# Patient Record
Sex: MALE | Race: WHITE
[De-identification: names, ages, dates, MRNs, and addresses within clinical notes are randomized per-mention and may not be internally consistent; named-entity substitution may affect disease eponyms.]

---

## 2019-02-22 ENCOUNTER — HOSPITAL ENCOUNTER (INPATIENT)
Dept: HOSPITAL 31 - C.ER | Age: 69
LOS: 4 days | Discharge: HOME | DRG: 378 | End: 2019-02-26
Attending: FAMILY MEDICINE | Admitting: FAMILY MEDICINE
Payer: COMMERCIAL

## 2019-02-22 VITALS — BODY MASS INDEX: 26.8 KG/M2

## 2019-02-22 DIAGNOSIS — D72.829: ICD-10-CM

## 2019-02-22 DIAGNOSIS — Z87.891: ICD-10-CM

## 2019-02-22 DIAGNOSIS — K64.0: ICD-10-CM

## 2019-02-22 DIAGNOSIS — Z80.1: ICD-10-CM

## 2019-02-22 DIAGNOSIS — K63.5: ICD-10-CM

## 2019-02-22 DIAGNOSIS — Z95.1: ICD-10-CM

## 2019-02-22 DIAGNOSIS — I25.10: ICD-10-CM

## 2019-02-22 DIAGNOSIS — L03.116: ICD-10-CM

## 2019-02-22 DIAGNOSIS — K57.31: Primary | ICD-10-CM

## 2019-02-22 DIAGNOSIS — I10: ICD-10-CM

## 2019-02-22 DIAGNOSIS — K29.70: ICD-10-CM

## 2019-02-22 DIAGNOSIS — R73.9: ICD-10-CM

## 2019-02-22 DIAGNOSIS — Z79.01: ICD-10-CM

## 2019-02-22 DIAGNOSIS — D50.0: ICD-10-CM

## 2019-02-22 DIAGNOSIS — F10.10: ICD-10-CM

## 2019-02-22 DIAGNOSIS — F43.9: ICD-10-CM

## 2019-02-22 LAB
ALBUMIN SERPL-MCNC: 3.9 G/DL (ref 3.5–5)
ALBUMIN/GLOB SERPL: 1.5 {RATIO} (ref 1–2.1)
ALT SERPL-CCNC: 20 U/L (ref 21–72)
APTT BLD: 28 SECONDS (ref 21–34)
AST SERPL-CCNC: 39 U/L (ref 17–59)
BASOPHILS # BLD AUTO: 0 K/UL (ref 0–0.2)
BASOPHILS # BLD AUTO: 0 K/UL (ref 0–0.2)
BASOPHILS NFR BLD: 0.2 % (ref 0–2)
BASOPHILS NFR BLD: 0.4 % (ref 0–2)
BUN SERPL-MCNC: 32 MG/DL (ref 9–20)
CALCIUM SERPL-MCNC: 8.9 MG/DL (ref 8.6–10.4)
EOSINOPHIL # BLD AUTO: 0.2 K/UL (ref 0–0.7)
EOSINOPHIL # BLD AUTO: 0.2 K/UL (ref 0–0.7)
EOSINOPHIL NFR BLD: 1.2 % (ref 0–4)
EOSINOPHIL NFR BLD: 2.5 % (ref 0–4)
ERYTHROCYTE [DISTWIDTH] IN BLOOD BY AUTOMATED COUNT: 13 % (ref 11.5–14.5)
ERYTHROCYTE [DISTWIDTH] IN BLOOD BY AUTOMATED COUNT: 13.1 % (ref 11.5–14.5)
GFR NON-AFRICAN AMERICAN: > 60
HGB BLD-MCNC: 10.5 G/DL (ref 12–18)
HGB BLD-MCNC: 8.4 G/DL (ref 12–18)
INR PPP: 1.1
LYMPHOCYTES # BLD AUTO: 1.4 K/UL (ref 1–4.3)
LYMPHOCYTES # BLD AUTO: 2 K/UL (ref 1–4.3)
LYMPHOCYTES NFR BLD AUTO: 10.4 % (ref 20–40)
LYMPHOCYTES NFR BLD AUTO: 31.4 % (ref 20–40)
MCH RBC QN AUTO: 30.8 PG (ref 27–31)
MCH RBC QN AUTO: 31.1 PG (ref 27–31)
MCHC RBC AUTO-ENTMCNC: 32.6 G/DL (ref 33–37)
MCHC RBC AUTO-ENTMCNC: 33.5 G/DL (ref 33–37)
MCV RBC AUTO: 92.7 FL (ref 80–94)
MCV RBC AUTO: 94.7 FL (ref 80–94)
MONOCYTES # BLD: 0.5 K/UL (ref 0–0.8)
MONOCYTES # BLD: 0.5 K/UL (ref 0–0.8)
MONOCYTES NFR BLD: 3.9 % (ref 0–10)
MONOCYTES NFR BLD: 8.1 % (ref 0–10)
NEUTROPHILS # BLD: 11.3 K/UL (ref 1.8–7)
NEUTROPHILS # BLD: 3.6 K/UL (ref 1.8–7)
NEUTROPHILS NFR BLD AUTO: 57.6 % (ref 50–75)
NEUTROPHILS NFR BLD AUTO: 84.3 % (ref 50–75)
NRBC BLD AUTO-RTO: 0 % (ref 0–2)
NRBC BLD AUTO-RTO: 0 % (ref 0–2)
PLATELET # BLD: 171 K/UL (ref 130–400)
PLATELET # BLD: 225 K/UL (ref 130–400)
PMV BLD AUTO: 7.8 FL (ref 7.2–11.7)
PMV BLD AUTO: 8.5 FL (ref 7.2–11.7)
PROTHROMBIN TIME: 11.8 SECONDS (ref 9.7–12.2)
RBC # BLD AUTO: 2.68 MIL/UL (ref 4.4–5.9)
RBC # BLD AUTO: 3.42 MIL/UL (ref 4.4–5.9)
WBC # BLD AUTO: 13.3 K/UL (ref 4.8–10.8)
WBC # BLD AUTO: 6.3 K/UL (ref 4.8–10.8)

## 2019-02-22 RX ADMIN — HUMAN INSULIN SCH: 100 INJECTION, SOLUTION SUBCUTANEOUS at 21:19

## 2019-02-22 NOTE — C.PDOC
History Of Present Illness


68 year old male presents to ED with complaint of new onset, multiple episodes 

of bright red blood per rectum since 5 pm yesterday. Last episode was at 0800. 

Patient states there is bright red blood with clots with every bowel movement. 

Patient states he has also been experiencing occasional lightheadedness. Patient

has history of prior SIM episode due to "bleeding vein" in the rectum but 

current symptoms are worse. He is currently taking Plavix. Patient denies 

abdominal pain and rectal pain. Patient denies having abdominal surgery. 








NEW ONSET MULTIPLE BRBPR SINCE 5 PM YEST. LAST EPISODE 0800, PS BRB W CLOTS W 

EVERY BM. +ASSOC OCC LIGHTHEADED. NO ABD, RECTAL PAIN. HO PRIOR SIM EPISODE DUE 

TO "BLEEDING VEIN" IN RECTUM BUT CURRENT SX MORE WORSE. DENIES ABD SURG. ON 

PLAVIX.





EXAM


NONTOXIC


HEENT NO PALLOR


ABD NEG


+BRBPR


REMAINDER NEG


Time Seen by Provider: 02/22/19 12:06


Chief Complaint (Nursing): GI Problem


History Per: Patient


History/Exam Limitations: no limitations


Onset/Duration Of Symptoms: Days (1), Sudden Onset


Current Symptoms Are (Timing): Still Present


Number Of Bleeding Episodes: Multiple:


Associated Symptoms: Lightheadedness





Past Medical History


Reviewed: Historical Data, Nursing Documentation, Vital Signs


Vital Signs: 





                                Last Vital Signs











Temp  97.5 F L  02/22/19 11:56


 


Pulse  102 H  02/22/19 11:56


 


Resp  18   02/22/19 11:56


 


BP  156/74 H  02/22/19 11:56


 


Pulse Ox  100   02/22/19 11:56














- Medical History


PMH: HTN


   Denies: Chronic Kidney Disease


Surgical History: CABG (2013)





- [facility] Procedures











MEASURE OF CARDIAC SAMPL & PRESSURE, L HEART, PERC APPROACH (05/14/16)


PLAIN RADIOGRAPHY OF LEFT HEART USING OTHER CONTRAST (05/14/16)


PLAIN RADIOGRAPHY OF MULT COR ART USING OTH CONTRAST (05/14/16)








Family History: States: Unknown Family Hx





- Social History


Hx Alcohol Use: No (social)


Hx Substance Use: No





- Immunization History


Hx Tetanus Toxoid Vaccination: No


Hx Influenza Vaccination: No


Hx Pneumococcal Vaccination: No





Review Of Systems


Constitutional: Negative for: Fever, Chills, Weakness


Cardiovascular: Positive for: Light Headedness


Gastrointestinal: Positive for: Other (bright red blood per rectum).  Negative 

for: Nausea, Vomiting, Abdominal Pain, Rectal Pain


Neurological: Negative for: Weakness, Numbness, Dizziness





Physical Exam





- Physical Exam


Appears: Well, Non-toxic, No Acute Distress


Skin: Normal Color, Warm, Dry, No Pale


Head: Atraumatic, Normacephalic


Neck: Normal ROM, Supple


Chest: Symmetrical, No Deformity


Cardiovascular: Rhythm Regular, No Murmur


Respiratory: No Accessory Muscle Use, No Rales, No Rhonchi, No Wheezing


Gastrointestinal/Abdominal: Soft, No Tenderness


Rectal: Heme Positive (BRBPR)


Extremity: Capillary Refill (<2 seconds)


Extremity: Bilateral: Atraumatic, Normal Color And Temperature


Pulses: Left Radial: Normal, Right Radial: Normal


Neurological/Psych: Oriented x3, Normal Speech, Normal Cognition





ED Course And Treatment





- Laboratory Results


Result Diagrams: 


                                 02/22/19 12:42





                                 02/22/19 12:42


O2 Sat by Pulse Oximetry: 100 (RA)





- Other Rad


  ** CXR


X-Ray: Interpreted by Me, Viewed By Me


Interpretation: Accession No. : [id number].  Patient Name / ID : PASCALE SAUCEDA  / [id number].  Exam Date : 02/22/2019 12:44:14 ( Approved ).  Study 

Comment :  Sex / Age : M  / 68Y.  Creator : Enrrique Macias MD.  Dictator : 

Enrrique Macias MD.   :  Approver : Enrrique Macias MD.  

Approver2 :  Report Date : 02/22/2019 13:09:26.  My Comment :  

**************************************************************************

*********.  Date of service:  02/22/2019.  HISTORY:  GI Bleeding.  COMPARISON:  

Comparison made with prior chest radiograph dated 05/13/2016.  FINDINGS:  LUNGS:

 Minor bibasilar atelectasis and or scarring.  PLEURA:  No significant pleural 

effusion identified, no pneumothorax apparent.  CARDIOVASCULAR:  Sternotomy 

wires and CABG clips and mitral valve replacement..  Heart size upper limits of 

normal/borderline enlarged.  Mild aortic atherosclerotic calcification present. 

OSSEOUS STRUCTURES:  No significant abnormalities.  VISUALIZED UPPER ABDOMEN:  

Normal.  OTHER FINDINGS:  None.  IMPRESSION:  Minor bibasilar atelectasis and or

scarring.  No significant interval change.


Progress Note: Labs ordered with UA and Stool sample. EKG and CXR ordered for 

patient.





Progress





- Re-Evaluation


Re-evaluation Note: 





02/22/19 13:13


NO RECUR GI BLEED SINCE INITAL EVAL.





H/H LOWER COMPARED TO PRIOR. PENDING CALLBACK HOSP


02/22/19 13:15


D/W DR COLBY DARLING C/F PMD WILL ADMIT





- Data Reviewed


Data Reviewed: Lab, Diagnostic imaging, EKG, Old records





- Critical Care


Citical Care: Excluding Proc Time


Critical Care Time: 60 minutes





Disposition


Counseled Patient/Family Regarding: Studies Performed, Diagnosis





- Disposition


Disposition: HOSPITALIZED


Disposition Time: 13:15


Condition: STABLE





- POA


Present On Arrival: None





- Clinical Impression


Clinical Impression: 


 Hx of CABG, Lower GI bleed, Near syncope, Anemia








- Scribe Statement


The provider has reviewed the documentation as recorded by the Scribe (Rosio Harp)








All medical record entries made by the Scribe were at my direction and 

personally dictated by me. I have reviewed the chart and agree that the record 

accurately reflects my personal performance of the history, physical exam, 

medical decision making, and the department course for this patient. I have also

personally directed, reviewed, and agree with the discharge instructions and 

disposition.

## 2019-02-22 NOTE — CP.PCM.HP
<Sky Francis - Last Filed: 02/22/19 16:46>





History of Present Illness





- History of Present Illness


History of Present Illness: 





cc: blood in my toilet, its so red, help me





HPI


68M with a PMHx of HTN, CAD, and hemorrhoids presents to the ED with a 1 day hx 

of blood in his toilet. Pt reports bright red clotted stool starting at aroung 

17:00 yesterday.  He had another episode this morning at 8:00. Pt has hade 

episode of bleeding per rectum earlier this year in July for which he followed 

up with a GI doctor at [facility]. Pt says the last bloody bowel 

movement caused him to have some light headedness. Pt has been taking plavix for

his CAD but stopped this morning. Pt reports painless and loose stool. Reports 

last drink was sunday. 





pmhx: HTN and CAD, bleeding vein per rectum


pshx: CABG in 2013, Back surgery 1998


shx: former smoker quit 10 years ago, denies drug use, drinks a six pack on 

weekends, retired 


fhx: mom lung CA


pmd: Dr Avila, GI: [facility]


 


Home Rx


Lipitor 40


ambien 10 hs


flexeril 10


coreg 3.125 qd


plavix 75 qd


ASA 81 qd


motrin 800mg once/ twice a week for lbp





Broth proxy Hayder 


Full code





Present on Admission





- Present on Admission


Any Indicators Present on Admission: No





Review of Systems





- Constitutional


Constitutional: absent: Headache, Night Sweats, Weight Loss





- EENT


Eyes: absent: Blurred Vision





- Cardiovascular


Cardiovascular: Lightheadedness.  absent: Chest Pain, Dyspnea, Pain Radiating to

Arm/Neck/Jaw





- Gastrointestinal


Gastrointestinal: Change in Stool Character, Hematochezia, Loose Stools.  

absent: Abdominal Pain





- Musculoskeletal


Musculoskeletal: absent: Myalgias, Numbness





- Integumentary


Integumentary: absent: Pruritus, Rash





- Neurological


Neurological: Dizziness.  absent: Frequent Falls, Headaches, Memory Loss, 

Syncope, Vertigo





- Psychiatric


Psychiatric: absent: Anxiety, Difficulty Concentrating





- Hematologic/Lymphatic


Hematologic: absent: Easy Bleeding





Past Patient History





- Infectious Disease


Hx of Infectious Diseases: None





- Tetanus Immunizations


Tetanus Immunization: Unknown





- Past Medical History & Family History


Past Medical History?: Yes





- Past Social History


Smoking Status: Never Smoked





- CARDIAC


Hx Hypertension: Yes





- PULMONARY


Hx Respiratory Disorders: No





- NEUROLOGICAL


Hx Neurological Disorder: No





- HEENT


Hx HEENT Problems: No





- RENAL


Hx Chronic Kidney Disease: No





- ENDOCRINE/METABOLIC


Hx Endocrine Disorders: No





- HEMATOLOGICAL/ONCOLOGICAL


Hx Blood Disorders: No





- INTEGUMENTARY


Hx Dermatological Problems: No


Other/Comment: L leg cellulitis





- MUSCULOSKELETAL/RHEUMATOLOGICAL


Hx Musculoskeletal Disorders: No


Hx Falls: No


Other/Comment: states sometimes he loses balance





- GASTROINTESTINAL


Hx Gastrointestinal Disorders: No





- GENITOURINARY/GYNECOLOGICAL


Hx Genitourinary Disorders: No





- PSYCHIATRIC


Hx Substance Use: No





- SURGICAL HISTORY


Hx Coronary Artery Bypass Graft: Yes (2013)





- ANESTHESIA


Hx Anesthesia: Yes


Hx Anesthesia Reactions: No


Hx Malignant Hyperthermia: No





Meds


Allergies/Adverse Reactions: 


                                    Allergies











Allergy/AdvReac Type Severity Reaction Status Date / Time


 


No Known Allergies Allergy   Verified 05/13/16 15:51














Physical Exam





- Constitutional


Appears: Well, Non-toxic, No Acute Distress





- Head Exam


Head Exam: ATRAUMATIC, NORMAL INSPECTION





- Eye Exam


Eye Exam: EOMI, Normal appearance.  absent: Conjunctival injection (pale 

conjunctive)





- ENT Exam


ENT Exam: Mucous Membranes Moist





- Respiratory Exam


Respiratory Exam: Clear to Auscultation Bilateral, NORMAL BREATHING PATTERN.  

absent: Rales, Wheezes





- Cardiovascular Exam


Cardiovascular Exam: RRR, +S1, +S2





- GI/Abdominal Exam


GI & Abdominal Exam: Normal Bowel Sounds, Soft.  absent: Guarding, Rebound, 

Rigid, Tenderness





- Rectal Exam


Rectal Exam: Bloody Stool.  absent: Black Stool, Hemorrhoids, Fecal Impaction


Additional comments: 





malodorous, bloody smear on FOBT cards note, painless ISELA 





- Extremities Exam


Extremities exam: Positive for: pedal pulses present





- Neurological Exam


Neurological exam: Alert, CN II-XII Intact, Oriented x3





- Skin


Skin Exam: Dry, Pallor, Warm





Results





- Vital Signs


Recent Vital Signs: 





                                Last Vital Signs











Temp  97.5 F L  02/22/19 11:56


 


Pulse  81   02/22/19 12:26


 


Resp  20   02/22/19 12:26


 


BP  138/71   02/22/19 12:26


 


Pulse Ox  100   02/22/19 13:45














- Labs


Result Diagrams: 


                                 02/22/19 12:42





                                 02/22/19 12:42


Labs: 





                         Laboratory Results - last 24 hr











  02/22/19 02/22/19 02/22/19





  12:42 12:42 12:42


 


WBC  13.3 H D  


 


RBC  3.42 L  


 


Hgb  10.5 L D  


 


Hct  32.3 L  


 


MCV  94.7 H D  


 


MCH  30.8  


 


MCHC  32.6 L  


 


RDW  13.1  


 


Plt Count  225  


 


MPV  8.5  


 


Neut % (Auto)  84.3 H  


 


Lymph % (Auto)  10.4 L  


 


Mono % (Auto)  3.9  


 


Eos % (Auto)  1.2  


 


Baso % (Auto)  0.2  


 


Neut # (Auto)  11.3 H  


 


Lymph # (Auto)  1.4  


 


Mono # (Auto)  0.5  


 


Eos # (Auto)  0.2  


 


Baso # (Auto)  0.0  


 


PT   11.8 


 


INR   1.1 


 


APTT   28 


 


Sodium    136


 


Potassium    3.7


 


Chloride    104


 


Carbon Dioxide    25


 


Anion Gap    11


 


BUN    32 H


 


Creatinine    1.0


 


Est GFR ( Amer)    > 60


 


Est GFR (Non-Af Amer)    > 60


 


Random Glucose    219 H D


 


Calcium    8.9


 


Total Bilirubin    1.1


 


AST    39


 


ALT    20 L D


 


Alkaline Phosphatase    69


 


Total Protein    6.5


 


Albumin    3.9


 


Globulin    2.6


 


Albumin/Globulin Ratio    1.5


 


Blood Type   


 


Antibody Screen   














  02/22/19





  12:42


 


WBC 


 


RBC 


 


Hgb 


 


Hct 


 


MCV 


 


MCH 


 


MCHC 


 


RDW 


 


Plt Count 


 


MPV 


 


Neut % (Auto) 


 


Lymph % (Auto) 


 


Mono % (Auto) 


 


Eos % (Auto) 


 


Baso % (Auto) 


 


Neut # (Auto) 


 


Lymph # (Auto) 


 


Mono # (Auto) 


 


Eos # (Auto) 


 


Baso # (Auto) 


 


PT 


 


INR 


 


APTT 


 


Sodium 


 


Potassium 


 


Chloride 


 


Carbon Dioxide 


 


Anion Gap 


 


BUN 


 


Creatinine 


 


Est GFR ( Amer) 


 


Est GFR (Non-Af Amer) 


 


Random Glucose 


 


Calcium 


 


Total Bilirubin 


 


AST 


 


ALT 


 


Alkaline Phosphatase 


 


Total Protein 


 


Albumin 


 


Globulin 


 


Albumin/Globulin Ratio 


 


Blood Type  O POSITIVE


 


Antibody Screen  Negative














Assessment & Plan





- Assessment and Plan (Free Text)


Assessment: 





68M admitted for blood per rectum and hx of 2 bloody stools





Acute Lower GI bleed possibly due to colonic pathology


GI consulted: Dr Linda


PMD Dr Avila contacted re prior workup at [facility] for prior scope hx


f/u cbc q6 until 3pm tmrw


PTX 40 IVP q12


f/u CMP


f/u COAGs


Type and Screen


LRs @ 





Anemia secondary to Lower GI bleed


f/u AM Labs


cbc q6


f/u GI recs





Leukocytosis likely secondary to Stress


afebrile


normal vitals


no culture/ abx for now


f/u CBCs





Elevated BUN


LRs @ 125


f/u am CMP





Hyperglycemia


f/u a1c


f/u tsh and free t4


ISS Low





Excessive ETOH use


last drink was 6 beers on sunday


watch for signs of withdrawl, please page on call Dr Tloentino of CAD s/p CABG '13


Crestor 10 HS


Coreg 3.125 qd


hold ASA and Plavix





Hx of HTN


Coreg 3.125 qd





Lung CA Screening


30 pk yr, f/u w/ Margarita





PPx


Hold anti coag


SCDs bilaterally


PTX 40 IVP q12





<Timo Marroquin - Last Filed: 02/22/19 19:05>





Results





- Vital Signs


Recent Vital Signs: 





                                Last Vital Signs











Temp  97.8 F   02/22/19 15:37


 


Pulse  80   02/22/19 15:37


 


Resp  20   02/22/19 15:37


 


BP  125/69   02/22/19 15:37


 


Pulse Ox  99   02/22/19 15:37














- Labs


Result Diagrams: 


                                 02/22/19 12:42





                                 02/22/19 12:42


Labs: 





                         Laboratory Results - last 24 hr











  02/22/19 02/22/19 02/22/19





  12:42 12:42 12:42


 


WBC  13.3 H D  


 


RBC  3.42 L  


 


Hgb  10.5 L D  


 


Hct  32.3 L  


 


MCV  94.7 H D  


 


MCH  30.8  


 


MCHC  32.6 L  


 


RDW  13.1  


 


Plt Count  225  


 


MPV  8.5  


 


Neut % (Auto)  84.3 H  


 


Lymph % (Auto)  10.4 L  


 


Mono % (Auto)  3.9  


 


Eos % (Auto)  1.2  


 


Baso % (Auto)  0.2  


 


Neut # (Auto)  11.3 H  


 


Lymph # (Auto)  1.4  


 


Mono # (Auto)  0.5  


 


Eos # (Auto)  0.2  


 


Baso # (Auto)  0.0  


 


PT   11.8 


 


INR   1.1 


 


APTT   28 


 


Sodium    136


 


Potassium    3.7


 


Chloride    104


 


Carbon Dioxide    25


 


Anion Gap    11


 


BUN    32 H


 


Creatinine    1.0


 


Est GFR ( Amer)    > 60


 


Est GFR (Non-Af Amer)    > 60


 


Random Glucose    219 H D


 


Calcium    8.9


 


Total Bilirubin    1.1


 


AST    39


 


ALT    20 L D


 


Alkaline Phosphatase    69


 


Total Protein    6.5


 


Albumin    3.9


 


Globulin    2.6


 


Albumin/Globulin Ratio    1.5


 


Stool Occult Blood   


 


Blood Type   


 


Antibody Screen   














  02/22/19 02/22/19





  12:42 14:52


 


WBC  


 


RBC  


 


Hgb  


 


Hct  


 


MCV  


 


MCH  


 


MCHC  


 


RDW  


 


Plt Count  


 


MPV  


 


Neut % (Auto)  


 


Lymph % (Auto)  


 


Mono % (Auto)  


 


Eos % (Auto)  


 


Baso % (Auto)  


 


Neut # (Auto)  


 


Lymph # (Auto)  


 


Mono # (Auto)  


 


Eos # (Auto)  


 


Baso # (Auto)  


 


PT  


 


INR  


 


APTT  


 


Sodium  


 


Potassium  


 


Chloride  


 


Carbon Dioxide  


 


Anion Gap  


 


BUN  


 


Creatinine  


 


Est GFR ( Amer)  


 


Est GFR (Non-Af Amer)  


 


Random Glucose  


 


Calcium  


 


Total Bilirubin  


 


AST  


 


ALT  


 


Alkaline Phosphatase  


 


Total Protein  


 


Albumin  


 


Globulin  


 


Albumin/Globulin Ratio  


 


Stool Occult Blood   Positive H


 


Blood Type  O POSITIVE 


 


Antibody Screen  Negative 














Attending/Attestation





- Attestation


I have personally seen and examined this patient.: Yes


I have fully participated in the care of the patient.: Yes


I have reviewed all pertinent clinical information: Yes


Notes (Text): 





02/22/19 19:02


Patient was seen and examined shortly after resident Dr. Francis.





History, physical, assessment and plan and all orders were gone over in detail 

with Dr. Francis.





Spoke with GI Dr. Mitchell (Dr. Bhandari will be covering this weekend): montior 

HgB/Hct for now and if there is a huge drop then will perform Colonoscopy 

earlier but would rather wait considering ASA and Plavix use for Hx CABG.





Plan of care was explained to patient by me in Lithuanian.





Timo Marroquin D.O.

## 2019-02-22 NOTE — RAD
Date of service: 



02/22/2019



HISTORY:

 GI Bleeding 



COMPARISON:

Comparison made with prior chest radiograph dated 05/13/2016.



FINDINGS:



LUNGS:

Minor bibasilar atelectasis and or scarring



PLEURA:

No significant pleural effusion identified, no pneumothorax apparent.



CARDIOVASCULAR:

Sternotomy wires and CABG clips and mitral valve replacement..  Heart 

size upper limits of normal/borderline enlarged.  Mild aortic 

atherosclerotic calcification present.



OSSEOUS STRUCTURES:

No significant abnormalities.



VISUALIZED UPPER ABDOMEN:

Normal.



OTHER FINDINGS:

None.



IMPRESSION:

Minor bibasilar atelectasis and or scarring.  No significant interval 

change.

## 2019-02-22 NOTE — CP.PCM.CON
History of Present Illness





- History of Present Illness


History of Present Illness: 


69 yo male h/o CAD, HTN, now admitted with RB.  1 episode last night and another

episode this am at home.  No bleeding since at hosp.  Pt reports hemorrhoids and

bleeding.  Reports neg colonsocopy 8 months ago.





Review of Systems





- Constitutional


Constitutional: absent: Fatigue, Fever, Weight Gain, Weight Loss





- EENT


Eyes: absent: Photophobia


Nose/Mouth/Throat: absent: Dental Pain





- Cardiovascular


Cardiovascular: absent: Chest Pain, Dyspnea





- Respiratory


Respiratory: absent: Hemoptysis, Wheezing





- Gastrointestinal


Gastrointestinal: Hematochezia.  absent: Abdominal Pain, Bloating, Coffee Ground

Emesis, Constipation, Dyspepsia, Dysphagia, Hematemesis, Loose Stools, Melena, 

Nausea, Vomiting





- Genitourinary


Genitourinary: absent: Dysuria, Hematuria





- Integumentary


Integumentary: absent: Jaundice





- Neurological


Neurological: absent: Convulsions, Numbness





Past Patient History





- Infectious Disease


Hx of Infectious Diseases: None





- Tetanus Immunizations


Tetanus Immunization: Unknown





- Past Medical History & Family History


Past Medical History?: Yes





- Past Social History


Smoking Status: Never Smoked





- CARDIAC


Hx Hypertension: Yes





- PULMONARY


Hx Respiratory Disorders: No





- NEUROLOGICAL


Hx Neurological Disorder: No





- HEENT


Hx HEENT Problems: No





- RENAL


Hx Chronic Kidney Disease: No





- ENDOCRINE/METABOLIC


Hx Endocrine Disorders: No





- HEMATOLOGICAL/ONCOLOGICAL


Hx Blood Disorders: No





- INTEGUMENTARY


Hx Dermatological Problems: No


Other/Comment: L leg cellulitis





- MUSCULOSKELETAL/RHEUMATOLOGICAL


Hx Musculoskeletal Disorders: No


Hx Falls: No


Other/Comment: states sometimes he loses balance





- GASTROINTESTINAL


Hx Gastrointestinal Disorders: No





- GENITOURINARY/GYNECOLOGICAL


Hx Genitourinary Disorders: No





- PSYCHIATRIC


Hx Substance Use: No





- SURGICAL HISTORY


Hx Coronary Artery Bypass Graft: Yes (2013)





- ANESTHESIA


Hx Anesthesia: Yes


Hx Anesthesia Reactions: No


Hx Malignant Hyperthermia: No





Meds


Allergies/Adverse Reactions: 


                                    Allergies











Allergy/AdvReac Type Severity Reaction Status Date / Time


 


No Known Allergies Allergy   Verified 05/13/16 15:51














- Medications


Medications: 


                               Current Medications





Lactated Ringer's (Lactated Ringer's)  1,000 mls @ 125 mls/hr IV .Q8H [facility]


   Last Admin: 02/22/19 16:29 Dose:  125 mls/hr


Pantoprazole Sodium (Protonix Inj)  40 mg IVP Q12H [facility]


   Last Admin: 02/22/19 16:30 Dose:  40 mg











Physical Exam





- Constitutional


Appears: Well, Non-toxic





- Respiratory Exam


Respiratory Exam: Clear to Auscultation Bilateral





- Cardiovascular Exam


Cardiovascular Exam: RRR





- GI/Abdominal Exam


GI & Abdominal Exam: Normal Bowel Sounds, Soft.  absent: Guarding, Mass, 

Rebound, Tenderness





- Extremities Exam


Extremities exam: Negative for: calf tenderness





- Neurological Exam


Neurological exam: Alert, Oriented x3





- Psychiatric Exam


Psychiatric exam: Normal Mood





Results





- Vital Signs


Recent Vital Signs: 


                                Last Vital Signs











Temp  97.5 F L  02/22/19 11:56


 


Pulse  77   02/22/19 14:20


 


Resp  18   02/22/19 14:20


 


BP  125/63   02/22/19 14:20


 


Pulse Ox  97   02/22/19 14:20














- Labs


Result Diagrams: 


                                 02/22/19 12:42





                                 02/22/19 12:42


Labs: 


                         Laboratory Results - last 24 hr











  02/22/19 02/22/19 02/22/19





  12:42 12:42 12:42


 


WBC  13.3 H D  


 


RBC  3.42 L  


 


Hgb  10.5 L D  


 


Hct  32.3 L  


 


MCV  94.7 H D  


 


MCH  30.8  


 


MCHC  32.6 L  


 


RDW  13.1  


 


Plt Count  225  


 


MPV  8.5  


 


Neut % (Auto)  84.3 H  


 


Lymph % (Auto)  10.4 L  


 


Mono % (Auto)  3.9  


 


Eos % (Auto)  1.2  


 


Baso % (Auto)  0.2  


 


Neut # (Auto)  11.3 H  


 


Lymph # (Auto)  1.4  


 


Mono # (Auto)  0.5  


 


Eos # (Auto)  0.2  


 


Baso # (Auto)  0.0  


 


PT   11.8 


 


INR   1.1 


 


APTT   28 


 


Sodium    136


 


Potassium    3.7


 


Chloride    104


 


Carbon Dioxide    25


 


Anion Gap    11


 


BUN    32 H


 


Creatinine    1.0


 


Est GFR ( Amer)    > 60


 


Est GFR (Non-Af Amer)    > 60


 


Random Glucose    219 H D


 


Calcium    8.9


 


Total Bilirubin    1.1


 


AST    39


 


ALT    20 L D


 


Alkaline Phosphatase    69


 


Total Protein    6.5


 


Albumin    3.9


 


Globulin    2.6


 


Albumin/Globulin Ratio    1.5


 


Stool Occult Blood   


 


Blood Type   


 


Antibody Screen   














  02/22/19 02/22/19





  12:42 14:52


 


WBC  


 


RBC  


 


Hgb  


 


Hct  


 


MCV  


 


MCH  


 


MCHC  


 


RDW  


 


Plt Count  


 


MPV  


 


Neut % (Auto)  


 


Lymph % (Auto)  


 


Mono % (Auto)  


 


Eos % (Auto)  


 


Baso % (Auto)  


 


Neut # (Auto)  


 


Lymph # (Auto)  


 


Mono # (Auto)  


 


Eos # (Auto)  


 


Baso # (Auto)  


 


PT  


 


INR  


 


APTT  


 


Sodium  


 


Potassium  


 


Chloride  


 


Carbon Dioxide  


 


Anion Gap  


 


BUN  


 


Creatinine  


 


Est GFR ( Amer)  


 


Est GFR (Non-Af Amer)  


 


Random Glucose  


 


Calcium  


 


Total Bilirubin  


 


AST  


 


ALT  


 


Alkaline Phosphatase  


 


Total Protein  


 


Albumin  


 


Globulin  


 


Albumin/Globulin Ratio  


 


Stool Occult Blood   Positive H


 


Blood Type  O POSITIVE 


 


Antibody Screen  Negative 














Assessment & Plan





- Assessment and Plan (Free Text)


Assessment: 





1) RB- consider hemaorrhoids.  I doubt tumor- had rcent colonscoopy.  Consider 

diverticulosis, ectasia.  Bleeding may be promoted by ASA and plavix.  Hb 2 yra 

AGO was 12.  Rectal exam done by medcial team.  Discussed with Med Resident.  


REc- check Hb, anusol cream, consider repeat colonsoocpy.   Can Plavix be held?


2) CAD


3) HTN


4) Pt reports hemorrhoids

## 2019-02-23 LAB
ALBUMIN SERPL-MCNC: 2.8 G/DL (ref 3.5–5)
ALBUMIN/GLOB SERPL: 1.2 {RATIO} (ref 1–2.1)
ALT SERPL-CCNC: 27 U/L (ref 21–72)
APTT BLD: 31 SECONDS (ref 21–34)
AST SERPL-CCNC: 27 U/L (ref 17–59)
BASOPHILS # BLD AUTO: 0 K/UL (ref 0–0.2)
BASOPHILS NFR BLD: 0.5 % (ref 0–2)
BASOPHILS NFR BLD: 0.6 % (ref 0–2)
BASOPHILS NFR BLD: 0.6 % (ref 0–2)
BILIRUB UR-MCNC: NEGATIVE MG/DL
BUN SERPL-MCNC: 20 MG/DL (ref 9–20)
CALCIUM SERPL-MCNC: 8.1 MG/DL (ref 8.6–10.4)
EOSINOPHIL # BLD AUTO: 0.3 K/UL (ref 0–0.7)
EOSINOPHIL # BLD AUTO: 0.3 K/UL (ref 0–0.7)
EOSINOPHIL # BLD AUTO: 0.4 K/UL (ref 0–0.7)
EOSINOPHIL NFR BLD: 5.7 % (ref 0–4)
EOSINOPHIL NFR BLD: 6.4 % (ref 0–4)
EOSINOPHIL NFR BLD: 8.7 % (ref 0–4)
ERYTHROCYTE [DISTWIDTH] IN BLOOD BY AUTOMATED COUNT: 12.8 % (ref 11.5–14.5)
ERYTHROCYTE [DISTWIDTH] IN BLOOD BY AUTOMATED COUNT: 12.9 % (ref 11.5–14.5)
ERYTHROCYTE [DISTWIDTH] IN BLOOD BY AUTOMATED COUNT: 13.4 % (ref 11.5–14.5)
FOLATE SERPL-MCNC: 19.9 NG/ML
GFR NON-AFRICAN AMERICAN: > 60
GLUCOSE UR STRIP-MCNC: NORMAL MG/DL
HGB BLD-MCNC: 7.5 G/DL (ref 12–18)
HGB BLD-MCNC: 8.1 G/DL (ref 12–18)
HGB BLD-MCNC: 8.2 G/DL (ref 12–18)
INR PPP: 1.1
LEUKOCYTE ESTERASE UR-ACNC: (no result) LEU/UL
LYMPHOCYTES # BLD AUTO: 1.6 K/UL (ref 1–4.3)
LYMPHOCYTES # BLD AUTO: 2 K/UL (ref 1–4.3)
LYMPHOCYTES # BLD AUTO: 2.1 K/UL (ref 1–4.3)
LYMPHOCYTES NFR BLD AUTO: 39.5 % (ref 20–40)
LYMPHOCYTES NFR BLD AUTO: 41.9 % (ref 20–40)
LYMPHOCYTES NFR BLD AUTO: 42.9 % (ref 20–40)
MCH RBC QN AUTO: 31.2 PG (ref 27–31)
MCH RBC QN AUTO: 31.3 PG (ref 27–31)
MCH RBC QN AUTO: 31.7 PG (ref 27–31)
MCHC RBC AUTO-ENTMCNC: 33.5 G/DL (ref 33–37)
MCHC RBC AUTO-ENTMCNC: 33.7 G/DL (ref 33–37)
MCHC RBC AUTO-ENTMCNC: 33.8 G/DL (ref 33–37)
MCV RBC AUTO: 93 FL (ref 80–94)
MCV RBC AUTO: 93.1 FL (ref 80–94)
MCV RBC AUTO: 93.7 FL (ref 80–94)
MONOCYTES # BLD: 0.4 K/UL (ref 0–0.8)
MONOCYTES NFR BLD: 8 % (ref 0–10)
MONOCYTES NFR BLD: 8.4 % (ref 0–10)
MONOCYTES NFR BLD: 9.1 % (ref 0–10)
NEUTROPHILS # BLD: 1.8 K/UL (ref 1.8–7)
NEUTROPHILS # BLD: 2.1 K/UL (ref 1.8–7)
NEUTROPHILS # BLD: 2.1 K/UL (ref 1.8–7)
NEUTROPHILS NFR BLD AUTO: 42.1 % (ref 50–75)
NEUTROPHILS NFR BLD AUTO: 42.2 % (ref 50–75)
NEUTROPHILS NFR BLD AUTO: 43.4 % (ref 50–75)
NRBC BLD AUTO-RTO: 0 % (ref 0–2)
PH UR STRIP: 5 [PH] (ref 5–8)
PLATELET # BLD: 165 K/UL (ref 130–400)
PLATELET # BLD: 175 K/UL (ref 130–400)
PLATELET # BLD: 179 K/UL (ref 130–400)
PMV BLD AUTO: 8.3 FL (ref 7.2–11.7)
PMV BLD AUTO: 8.5 FL (ref 7.2–11.7)
PMV BLD AUTO: 8.6 FL (ref 7.2–11.7)
PROT UR STRIP-MCNC: (no result) MG/DL
PROTHROMBIN TIME: 11.9 SECONDS (ref 9.7–12.2)
RBC # BLD AUTO: 2.42 MIL/UL (ref 4.4–5.9)
RBC # BLD AUTO: 2.55 MIL/UL (ref 4.4–5.9)
RBC # BLD AUTO: 2.63 MIL/UL (ref 4.4–5.9)
RBC # UR STRIP: NEGATIVE /UL
SP GR UR STRIP: 1.02 (ref 1–1.03)
SQUAMOUS EPITHIAL: 1 /HPF (ref 0–5)
UROBILINOGEN UR-MCNC: NORMAL MG/DL (ref 0.2–1)
VIT B12 SERPL-MCNC: 266 PG/ML (ref 239–931)
WBC # BLD AUTO: 4.2 K/UL (ref 4.8–10.8)
WBC # BLD AUTO: 4.8 K/UL (ref 4.8–10.8)
WBC # BLD AUTO: 5 K/UL (ref 4.8–10.8)

## 2019-02-23 RX ADMIN — HUMAN INSULIN SCH: 100 INJECTION, SOLUTION SUBCUTANEOUS at 07:28

## 2019-02-23 RX ADMIN — HUMAN INSULIN SCH: 100 INJECTION, SOLUTION SUBCUTANEOUS at 21:34

## 2019-02-23 RX ADMIN — HUMAN INSULIN SCH: 100 INJECTION, SOLUTION SUBCUTANEOUS at 17:31

## 2019-02-23 RX ADMIN — HUMAN INSULIN SCH: 100 INJECTION, SOLUTION SUBCUTANEOUS at 12:09

## 2019-02-23 RX ADMIN — MAGNESIUM SULFATE IN DEXTROSE SCH MLS/HR: 10 INJECTION, SOLUTION INTRAVENOUS at 08:57

## 2019-02-23 RX ADMIN — MAGNESIUM SULFATE IN DEXTROSE SCH MLS/HR: 10 INJECTION, SOLUTION INTRAVENOUS at 08:58

## 2019-02-23 NOTE — CP.PCM.PN
Subjective





- Date & Time of Evaluation


Date of Evaluation: 02/23/19


Time of Evaluation: 09:45





- Subjective


Subjective: 





Patient denies having abdominal pain, nausea, vomiting.  He has not had any 

bleeding this morning.





Objective





- Vital Signs/Intake and Output


Vital Signs (last 24 hours): 


                                        











Temp Pulse Resp BP Pulse Ox


 


 98.4 F   77   20   127/65   97 


 


 02/23/19 07:00  02/23/19 07:20  02/23/19 07:00  02/23/19 07:00  02/23/19 07:00








Intake and Output: 


                                        











 02/23/19 02/23/19





 06:59 18:59


 


Intake Total 1200 


 


Balance 1200 














- Medications


Medications: 


                               Current Medications





Carvedilol (Coreg)  3.125 mg PO BID [facility]


   Last Admin: 02/23/19 09:31 Dose:  3.125 mg


Dextrose (Dextrose 50% Inj)  0 ml IV STAT PRN; Protocol


   PRN Reason: Hypoglycemia Protocol


Dextrose (Glutose 15)  0 gm PO ONCE PRN; Protocol


   PRN Reason: Hypoglycemia Protocol


Glucagon (Glucagen Diagnostic Kit)  0 mg IM STAT PRN; Protocol


   PRN Reason: Hypoglycemia Protocol


Lactated Ringer's (Lactated Ringer's)  1,000 mls @ 125 mls/hr IV .Q8H [facility]


   Last Admin: 02/23/19 01:47 Dose:  125 mls/hr


Dextrose (Dextrose 5% In Water 1000 Ml)  1,000 mls @ 0 mls/hr IV .Q0M PRN; 

Protocol


   PRN Reason: Hypoglycemia Protocol


Insulin Human Regular (Novolin R)  0 unit SC ACHS [facility]; Protocol


   Last Admin: 02/23/19 07:28 Dose:  Not Given


Pantoprazole Sodium (Protonix Inj)  40 mg IVP Q12H [facility]


   Last Admin: 02/23/19 04:27 Dose:  40 mg


Rosuvastatin Calcium (Crestor)  10 mg PO HS [facility]











- Labs


Labs: 


                                        





                                 02/23/19 07:43 





                                 02/23/19 07:43 





                                        











PT  11.9 SECONDS (9.7-12.2)   02/23/19  07:43    


 


INR  1.1   02/23/19  07:43    


 


APTT  31 SECONDS (21-34)   02/23/19  07:43    














- Constitutional


Appears: No Acute Distress





- Head Exam


Head Exam: ATRAUMATIC, NORMOCEPHALIC





- Eye Exam


Eye Exam: EOMI, PERRL





- Neck Exam


Neck Exam: absent: Lymphadenopathy, Thyromegaly





- Respiratory Exam


Respiratory Exam: NORMAL BREATHING PATTERN.  absent: Rales, Rhonchi, Wheezes





- Cardiovascular Exam


Cardiovascular Exam: REGULAR RHYTHM, +S1, +S2.  absent: Gallop, Rubs, Murmur





- GI/Abdominal Exam


GI & Abdominal Exam: Soft, Normal Bowel Sounds.  absent: Tenderness, Mass, 

Organomegaly





- Rectal Exam


Rectal Exam: Deferred





- Extremities Exam


Extremities Exam: absent: Calf Tenderness, Pedal Edema





Assessment and Plan


(1) GI bleed


Assessment & Plan: 


Patient has not had any bleeding this morning.  Vital signs have remained 

stable.  The HGB dropped to 8.1 this morning, down from 8.4 and 10.5.  Will 

schedule EGD and colonsocopy for the AM.


Status: Acute

## 2019-02-23 NOTE — CP.PCM.PN
<Ray Kamara - Last Filed: 02/23/19 09:01>





Subjective





- Date & Time of Evaluation


Date of Evaluation: 02/23/19


Time of Evaluation: 09:01





Objective





- Vital Signs/Intake and Output


Vital Signs (last 24 hours): 


                                        











Temp Pulse Resp BP Pulse Ox


 


 98.4 F   77   20   127/65   97 


 


 02/23/19 07:00  02/23/19 07:20  02/23/19 07:00  02/23/19 07:00  02/23/19 07:00








Intake and Output: 


                                        











 02/23/19 02/23/19





 06:59 18:59


 


Intake Total 1200 


 


Balance 1200 














- Medications


Medications: 


                               Current Medications





Carvedilol (Coreg)  3.125 mg PO BID [facility]


Dextrose (Dextrose 50% Inj)  0 ml IV STAT PRN; Protocol


   PRN Reason: Hypoglycemia Protocol


Dextrose (Glutose 15)  0 gm PO ONCE PRN; Protocol


   PRN Reason: Hypoglycemia Protocol


Glucagon (Glucagen Diagnostic Kit)  0 mg IM STAT PRN; Protocol


   PRN Reason: Hypoglycemia Protocol


Lactated Ringer's (Lactated Ringer's)  1,000 mls @ 125 mls/hr IV .Q8H [facility]


   Last Admin: 02/23/19 01:47 Dose:  125 mls/hr


Dextrose (Dextrose 5% In Water 1000 Ml)  1,000 mls @ 0 mls/hr IV .Q0M PRN; 

Protocol


   PRN Reason: Hypoglycemia Protocol


Magnesium Sulfate/Dextrose (Magnesium Sulfate 1 Gm/100 Ml D5w)  1 gm in 100 mls 

@ 300 mls/hr IVPB Q30M [facility]


   Stop: 02/23/19 09:49


   Last Admin: 02/23/19 08:58 Dose:  300 mls/hr


Insulin Human Regular (Novolin R)  0 unit SC ACHS [facility]; Protocol


   Last Admin: 02/23/19 07:28 Dose:  Not Given


Pantoprazole Sodium (Protonix Inj)  40 mg IVP Q12H [facility]


   Last Admin: 02/23/19 04:27 Dose:  40 mg


Rosuvastatin Calcium (Crestor)  10 mg PO HS [facility]











- Labs


Labs: 


                                        





                                 02/23/19 07:43 





                                 02/23/19 07:43 





                                        











PT  11.9 SECONDS (9.7-12.2)   02/23/19  07:43    


 


INR  1.1   02/23/19  07:43    


 


APTT  31 SECONDS (21-34)   02/23/19  07:43    














<Timo Marroquin - Last Filed: 02/23/19 12:31>





Subjective





- Subjective


Subjective: 





*





Objective





- Vital Signs/Intake and Output


Vital Signs (last 24 hours): 


                                        











Temp Pulse Resp BP Pulse Ox


 


 98.4 F   77   20   127/65   97 


 


 02/23/19 07:00  02/23/19 07:20  02/23/19 07:00  02/23/19 07:00  02/23/19 07:00








Intake and Output: 


                                        











 02/23/19 02/23/19





 06:59 18:59


 


Intake Total 1200 


 


Balance 1200 














- Medications


Medications: 


                               Current Medications





Carvedilol (Coreg)  3.125 mg PO BID [facility]


   Last Admin: 02/23/19 09:31 Dose:  3.125 mg


Dextrose (Dextrose 50% Inj)  0 ml IV STAT PRN; Protocol


   PRN Reason: Hypoglycemia Protocol


Dextrose (Glutose 15)  0 gm PO ONCE PRN; Protocol


   PRN Reason: Hypoglycemia Protocol


Glucagon (Glucagen Diagnostic Kit)  0 mg IM STAT PRN; Protocol


   PRN Reason: Hypoglycemia Protocol


Lactated Ringer's (Lactated Ringer's)  1,000 mls @ 125 mls/hr IV .Q8H [facility]


   Last Admin: 02/23/19 01:47 Dose:  125 mls/hr


Dextrose (Dextrose 5% In Water 1000 Ml)  1,000 mls @ 0 mls/hr IV .Q0M PRN; 

Protocol


   PRN Reason: Hypoglycemia Protocol


Insulin Human Regular (Novolin R)  0 unit SC ACHS [facility]; Protocol


   Last Admin: 02/23/19 07:28 Dose:  Not Given


Pantoprazole Sodium (Protonix Inj)  40 mg IVP Q12H [facility]


   Last Admin: 02/23/19 04:27 Dose:  40 mg


Polyethylene Glycol/Electrolytes (Golytely)  4,000 ml PO ONCE ONE


   Stop: 02/23/19 19:01


Rosuvastatin Calcium (Crestor)  10 mg PO HS [facility]











- Labs


Labs: 


                                        





                                 02/23/19 11:18 





                                 02/23/19 07:43 





                                        











PT  11.9 SECONDS (9.7-12.2)   02/23/19  07:43    


 


INR  1.1   02/23/19  07:43    


 


APTT  31 SECONDS (21-34)   02/23/19  07:43    














Attending/Attestation





- Attestation


I have personally seen and examined this patient.: Yes


I have fully participated in the care of the patient.: Yes


I have reviewed all pertinent clinical information, including history, physical 

exam and plan: Yes


Notes (Text): 





02/23/19 12:02


Hospitalist Progress Note





Patient was seen and examined at 8:45 AM 2/23/19 Bed 652 A





68 year old  Korean speaking male (PMHx GI Bleed evaluated in July 2018

[facility] as per patient, Alcohol use on Weekends, CAD S/P CABG 

2014, HTN, Prior Nicotine Addiction) was admitted on 2/22/19 after Bright Red 

Blood Per Rectum on 2 occasions at home within 24 hours of presentation. 





Currently upon FULL ROS:


Has not had any bowel movements since his admission: has been on a clear liquid 

diet


Has been urinating without any issues


Lightheadedness/dizziness has resolved


NO palpitations


NO chest pain


NO abdominal pain


NO n/v/d/c


NO SOB/Cough/Wheezing


NO headaches


NO new changes in vision


NO new changes in hearing


NO paresthesias





Exam:


General: AAOX3, NAD, Resting comfortably


HEENT: NCA, EOMI, PERRLA, NO pharyngeal erythema/exudate, NO 

cervical/supraclavicular/submandibular lymphadenopathy, NO thyromegaly, Nasal 

Turbinates are moist without edema/erythema


Cardio: NS1 and NS2, NO M/R/G


Resp: CTA B/L, NO R/R/W


GI: BSx4, Soft, NT, ND, NO HSM, NO guarding/rebound tenderness


Ext: Pulses are strong and equal, Capillary Refill is 2 seconds, NO Edema


Neuro: CN II through XII are grossly intact





Assessment & Plan





1). Acute Lower GI bleed Possibly Due to Colonic Pathology


Status: Acute


GI consulted: Dr Linda however Dr. Bhandari is covering this weekend


PMD Dr Avila contacted 2/22/19 by resident Dr. Francis concerning prior 

Colonoscopy done at [facility] in July 2018 as per patient: Dr. Avila stated that he had no records of this.


Continue HgB/Hct monitoring every 6 hours


There has been a large drop in HgB/Hct 2/23/19 however there are NO more bloody 

bowel movements, Abdominal Exam is unremarkable, there is NO tachycardia, and 

patient is on LR @ 125 ml/hour: could this be dilutional effect?


GI Dr. Bhandari: due to large drop in HgB, patient is scheduled for EGD and 

Colonoscopy for morning 2/24/19


Protonix 40 mg IV Q12H indicated due to Lower GI Bleed


Full Liquid Diet and NPO after dinner 2/23/19





2). Anemia secondary to Lower GI bleed


Status: Acute


Continue HgB/Hct monitoring every 6 hours


There has been a large drop in HgB/Hct 2/23/19 however there are NO more blood 

bowel movements, Abdominal Exam is unremarkable, and patient is on LR @ 125 

ml/hour: this is likely dilutional effect 





3). Leukocytosis likely secondary to Stress


Status: Resolved


NO fevers


NO infiltrate noted on admission 2/22/19 Chest X Ray





4). Elevated BUN


Status: Resolved


Continue maintenance fluid LRs @ 125 ml/hr





5). Hyperglycemia


Status: Acute


NO history of DM


F/U HgBA1C





6). Abnormal Thyroid Blood Work


Status: Acute


TSH and T4 slightly low. Patient should have TSH and Free T4 repeated in 4 weeks

time





7). Excessive ETOH On Weekends


Status: Chronic


States that he has two 6 packs over the weekends


Last drink was 6 beers on Sunday 2/17/19


NO signs of withdrawl on exam





8). Hx of CAD S/P CABG 2013


Status: Chronic


Crestor 10 HS


Coreg 3.125 qd


Hold ASA and Plavix in light of the Lower GI Bleeding





9). Hx of HTN


Status: Chronic


Coreg 3.125 qd





10. Lung CA Screening


Status: Chronic


Former smoker that has a 30 + pack per day history of smoking with having quit <

15 years ago therefore will need outpatient CT Chest w/o contrast for Lung CA 

screening through his PMD Dr. Avila's office and this should be included in his

discharge instructions





11). PPx


Hold anticoagulation secondary to Lower GI Bleed


SCDs bilaterally


Protonix 40 mg IV Q12H indicated due to Lower GI Bleed


Full Liquid Diet





Disposition:


Spoke with GI Dr. Bhandari who is covering GI this weekend: due to large drop in

HgB, patient is scheduled for EGD and Colonoscopy for morning 2/24/19


Follow the HgB/Hct Q6H








Timo Marroquin D.O.

## 2019-02-24 VITALS — RESPIRATION RATE: 20 BRPM

## 2019-02-24 LAB
ALBUMIN SERPL-MCNC: 2.9 G/DL (ref 3.5–5)
ALBUMIN/GLOB SERPL: 1.2 {RATIO} (ref 1–2.1)
ALT SERPL-CCNC: 32 U/L (ref 21–72)
AST SERPL-CCNC: 35 U/L (ref 17–59)
BASOPHILS # BLD AUTO: 0 K/UL (ref 0–0.2)
BASOPHILS # BLD AUTO: 0 K/UL (ref 0–0.2)
BASOPHILS NFR BLD: 0.4 % (ref 0–2)
BASOPHILS NFR BLD: 0.6 % (ref 0–2)
BUN SERPL-MCNC: 12 MG/DL (ref 9–20)
CALCIUM SERPL-MCNC: 8.2 MG/DL (ref 8.6–10.4)
EOSINOPHIL # BLD AUTO: 0.4 K/UL (ref 0–0.7)
EOSINOPHIL # BLD AUTO: 0.4 K/UL (ref 0–0.7)
EOSINOPHIL NFR BLD: 7.5 % (ref 0–4)
EOSINOPHIL NFR BLD: 8.6 % (ref 0–4)
ERYTHROCYTE [DISTWIDTH] IN BLOOD BY AUTOMATED COUNT: 12.7 % (ref 11.5–14.5)
ERYTHROCYTE [DISTWIDTH] IN BLOOD BY AUTOMATED COUNT: 12.8 % (ref 11.5–14.5)
ERYTHROCYTE [DISTWIDTH] IN BLOOD BY AUTOMATED COUNT: 12.9 % (ref 11.5–14.5)
ERYTHROCYTE [DISTWIDTH] IN BLOOD BY AUTOMATED COUNT: 12.9 % (ref 11.5–14.5)
FERRITIN SERPL-MCNC: 24.3 NG/ML
GFR NON-AFRICAN AMERICAN: > 60
HGB BLD-MCNC: 7.4 G/DL (ref 12–18)
HGB BLD-MCNC: 7.7 G/DL (ref 12–18)
HGB BLD-MCNC: 8.2 G/DL (ref 12–18)
HGB BLD-MCNC: 8.6 G/DL (ref 12–18)
LYMPHOCYTES # BLD AUTO: 1.7 K/UL (ref 1–4.3)
LYMPHOCYTES # BLD AUTO: 1.8 K/UL (ref 1–4.3)
LYMPHOCYTES NFR BLD AUTO: 35.4 % (ref 20–40)
LYMPHOCYTES NFR BLD AUTO: 37.4 % (ref 20–40)
MCH RBC QN AUTO: 30.7 PG (ref 27–31)
MCH RBC QN AUTO: 31.4 PG (ref 27–31)
MCH RBC QN AUTO: 31.5 PG (ref 27–31)
MCH RBC QN AUTO: 31.6 PG (ref 27–31)
MCHC RBC AUTO-ENTMCNC: 33 G/DL (ref 33–37)
MCHC RBC AUTO-ENTMCNC: 33.7 G/DL (ref 33–37)
MCHC RBC AUTO-ENTMCNC: 33.9 G/DL (ref 33–37)
MCHC RBC AUTO-ENTMCNC: 34.1 G/DL (ref 33–37)
MCV RBC AUTO: 92.8 FL (ref 80–94)
MCV RBC AUTO: 92.9 FL (ref 80–94)
MCV RBC AUTO: 92.9 FL (ref 80–94)
MCV RBC AUTO: 93.1 FL (ref 80–94)
MONOCYTES # BLD: 0.3 K/UL (ref 0–0.8)
MONOCYTES # BLD: 0.3 K/UL (ref 0–0.8)
MONOCYTES NFR BLD: 6.3 % (ref 0–10)
MONOCYTES NFR BLD: 7.1 % (ref 0–10)
NEUTROPHILS # BLD: 2.2 K/UL (ref 1.8–7)
NEUTROPHILS # BLD: 2.4 K/UL (ref 1.8–7)
NEUTROPHILS NFR BLD AUTO: 46.5 % (ref 50–75)
NEUTROPHILS NFR BLD AUTO: 50.2 % (ref 50–75)
NRBC BLD AUTO-RTO: 0.1 % (ref 0–2)
NRBC BLD AUTO-RTO: 0.1 % (ref 0–2)
PLATELET # BLD: 161 K/UL (ref 130–400)
PLATELET # BLD: 175 K/UL (ref 130–400)
PLATELET # BLD: 175 K/UL (ref 130–400)
PLATELET # BLD: 182 K/UL (ref 130–400)
PMV BLD AUTO: 8.4 FL (ref 7.2–11.7)
PMV BLD AUTO: 8.6 FL (ref 7.2–11.7)
PMV BLD AUTO: 8.6 FL (ref 7.2–11.7)
PMV BLD AUTO: 8.8 FL (ref 7.2–11.7)
RBC # BLD AUTO: 2.37 MIL/UL (ref 4.4–5.9)
RBC # BLD AUTO: 2.51 MIL/UL (ref 4.4–5.9)
RBC # BLD AUTO: 2.61 MIL/UL (ref 4.4–5.9)
RBC # BLD AUTO: 2.72 MIL/UL (ref 4.4–5.9)
WBC # BLD AUTO: 4 K/UL (ref 4.8–10.8)
WBC # BLD AUTO: 4.7 K/UL (ref 4.8–10.8)
WBC # BLD AUTO: 4.8 K/UL (ref 4.8–10.8)
WBC # BLD AUTO: 4.9 K/UL (ref 4.8–10.8)

## 2019-02-24 PROCEDURE — 0DBN8ZX EXCISION OF SIGMOID COLON, VIA NATURAL OR ARTIFICIAL OPENING ENDOSCOPIC, DIAGNOSTIC: ICD-10-PCS | Performed by: INTERNAL MEDICINE

## 2019-02-24 PROCEDURE — 0DB68ZX EXCISION OF STOMACH, VIA NATURAL OR ARTIFICIAL OPENING ENDOSCOPIC, DIAGNOSTIC: ICD-10-PCS | Performed by: INTERNAL MEDICINE

## 2019-02-24 RX ADMIN — HUMAN INSULIN SCH: 100 INJECTION, SOLUTION SUBCUTANEOUS at 07:30

## 2019-02-24 RX ADMIN — HUMAN INSULIN SCH UNITS: 100 INJECTION, SOLUTION SUBCUTANEOUS at 12:30

## 2019-02-24 RX ADMIN — HUMAN INSULIN SCH: 100 INJECTION, SOLUTION SUBCUTANEOUS at 21:39

## 2019-02-24 RX ADMIN — HUMAN INSULIN SCH: 100 INJECTION, SOLUTION SUBCUTANEOUS at 17:27

## 2019-02-24 NOTE — CP.PCM.PN
Subjective





- Date & Time of Evaluation


Date of Evaluation: 02/24/19


Time of Evaluation: 09:45





- Subjective


Subjective: 





Hospitalist Progress Note





Patient was seen and examined at 9:45 AM 2/24/19 Bed 652 A





68 year old  Chilean speaking male (PMHx GI Bleed evaluated in July 2018

[facility] as per patient, Alcohol use on Weekends, CAD S/P CABG 

2014, HTN, Prior Nicotine Addiction) was admitted on 2/22/19 after Bright Red 

Blood Per Rectum on 2 occasions at home within 24 hours of presentation. His ASA

and Plavix (Hx of CAD S/P CABG) were held. His HgB/HCT are being monitored every

6 hours. He underwent Upper EGD 2/24/19 and this showed Gastritis and the 

Gastric Antrum was biopsied. He also underwent Colonoscopy 2/24/19 and this 

showed Diverticulosis and 2 small Sigmoid Colon polyps which were biopsied. 

Please see Assessment and Plans below for further details.





Currently upon FULL ROS:


Had normal bowel movement this morning and was nonbloody


Has been urinating without any issues


Lightheadedness/dizziness has resolved but feels drowsy from the anesthesia


NO palpitations


NO chest pain


NO abdominal pain


NO n/v/d/c


NO SOB/Cough/Wheezing


NO headaches


NO new changes in vision


NO new changes in hearing


NO paresthesias





Exam:


General: AAOX3, NAD, Resting comfortably


HEENT: NCA, EOMI, PERRLA, NO pharyngeal erythema/exudate, NO 

cervical/supraclavicular/submandibular lymphadenopathy, NO thyromegaly, Nasal 

Turbinates are moist without edema/erythema


Cardio: NS1 and NS2, NO M/R/G


Resp: CTA B/L, NO R/R/W


GI: BSx4, Soft, NT, ND, NO HSM, NO guarding/rebound tenderness


Ext: Pulses are strong and equal, Capillary Refill is 2 seconds, NO Edema


Neuro: CN II through XII are grossly intact





Assessment & Plan





1). Acute Lower GI bleed Possibly Due to Colonic Pathology


Status: Acute


GI consulted: Dr Linda however Dr. Bhandari is covering this weekend


PMD Dr Avila contacted 2/22/19 by resident Dr. Francis concerning prior 

Colonoscopy done at [facility] in July 2018 as per patient: Dr. Avila stated that he had no records of this.


Continue HgB/Hct monitoring every 6 hours


There has been a large drop in HgB/Hct 2/23/19 however there are NO more bloody 

bowel movements, Abdominal Exam is unremarkable, there is NO tachycardia, and 

patient was on LR @ 125 ml/hour: could this be dilutional effect or due to 

Colonic bleeding?


GI Dr. Bhandari: due to large drop in HgB, EGD and Colonoscopy performed morning

2/24/19:


* Upper EGD: localized mild inflammation characterized by erythema and 

  granularity was found in the Gastric Antrum and biopsies were taken


* Colonoscopy: internal hemorrhoids that are characterized as Grade I, multiple 

  small mouth diverticuli in sigmoid colon, and two flat topped polyps were 

  biopsied


Protonix 40 mg IV Q12H indicated due to Lower GI Bleed


Full Heart Healthy Diet





2). Anemia secondary to Lower GI bleed


Status: Acute


Continue HgB/Hct monitoring every 6 hours


There has been a large drop in HgB/Hct 2/23/19 however there are NO more blood 

bowel movements, Abdominal Exam is unremarkable, and patient was on LR @ 125 

ml/hour.


LR was discontinued 2/24/19 as patient was started on Full Heart Healthy Diet





3). Leukocytosis likely secondary to Stress


Status: Resolved


NO fevers


NO infiltrate noted on admission 2/22/19 Chest X Ray





4). Elevated BUN


Status: Resolved


LRs @ 125 ml/hr was discontinued after Upper EGD and Colonoscopy on 2/24/19





5). Hyperglycemia Upon Admission


Status: Resolved


NO history of DM


HgBA1C is normal at 5.8





6). Abnormal Thyroid Blood Work


Status: Acute


TSH and T4 slightly low. Patient should have TSH and Free T4 repeated in 2 to 4 

weeks time





7). Excessive ETOH On Weekends


Status: Chronic


States that he has two 6 packs over the weekends


Last drink was 6 beers on Sunday 2/17/19


NO signs of withdrawl on exam





8). Hx of CAD S/P CABG 2013


Status: Chronic


Crestor 10 HS


Coreg 3.125 qd


ASA and Plavix in light of the Lower GI Bleeding, were both held upon admission 

on 2/22/19


As long as HgB/Hct are stable, then restart ASA on 2/25/19 and then the Plavix 

on 2/27/19





9). Hx of HTN


Status: Chronic


Coreg 3.125 qd





10. Lung CA Screening


Status: Chronic


Former smoker that has a 30 + pack per day history of smoking with having quit <

15 years ago therefore will need outpatient CT Chest w/o contrast for Lung CA 

screening through his PMD Dr. Avila's office and this should be included in his

discharge instructions





11). PPx


Hold anticoagulation secondary to Lower GI Bleed


SCDs bilaterally


Protonix 40 mg IV Q12H indicated due to Lower GI Bleed


Full Heart Healthy Diet





Disposition:


As long as HgB/Hct are stable then restart the ASA on 2/25/19 and then the 

Plavix on 2/27/19


As long as no more bloody bowel movements and the HgB/Hct remain stable, then 

patient is for discharge on 2/25/19


Patient will need to follow up with GI Dr. Mitchell (Dr. Bhandari was covering for

him) for results of Sigmoid Colon Polyp x 2 biopsy results and Gastric Antrum 

Biopsy results


Patient will need to have his TSH and Free T4 remeasured by his PMD Dr. Avila 

in 2 to 4 weeks time


Patient will need to have CT Chest w/o contrast through his PMD Dr. Avila for 

Lung CA screening as an outpatient as he fulfills Lung Cancer screening criteria





Timo Marroquin D.O.





Objective





- Vital Signs/Intake and Output


Vital Signs (last 24 hours): 


                                        











Temp Pulse Resp BP Pulse Ox


 


 97.9 F   60   14   118/53 L  100 


 


 02/24/19 09:03  02/24/19 09:30  02/24/19 09:30  02/24/19 09:30  02/24/19 09:30








Intake and Output: 


                                        











 02/24/19 02/24/19





 06:59 18:59


 


Intake Total 1200 


 


Balance 1200 














- Medications


Medications: 


                               Current Medications





Carvedilol (Coreg)  3.125 mg PO BID [facility]


   Last Admin: 02/24/19 06:47 Dose:  3.125 mg


Dextrose (Dextrose 50% Inj)  0 ml IV STAT PRN; Protocol


   PRN Reason: Hypoglycemia Protocol


Dextrose (Glutose 15)  0 gm PO ONCE PRN; Protocol


   PRN Reason: Hypoglycemia Protocol


Glucagon (Glucagen Diagnostic Kit)  0 mg IM STAT PRN; Protocol


   PRN Reason: Hypoglycemia Protocol


Lactated Ringer's (Lactated Ringer's)  1,000 mls @ 125 mls/hr IV .Q8H [facility]


   Last Admin: 02/24/19 00:15 Dose:  Not Given


Dextrose (Dextrose 5% In Water 1000 Ml)  1,000 mls @ 0 mls/hr IV .Q0M PRN; 

Protocol


   PRN Reason: Hypoglycemia Protocol


Insulin Human Regular (Novolin R)  0 unit SC ACHS [facility]; Protocol


   Last Admin: 02/24/19 07:30 Dose:  Not Given


Pantoprazole Sodium (Protonix Inj)  40 mg IVP Q12H [facility]


   Last Admin: 02/24/19 04:46 Dose:  40 mg


Rosuvastatin Calcium (Crestor)  10 mg PO HS [facility]


   Last Admin: 02/23/19 22:18 Dose:  10 mg











- Labs


Labs: 


                                        





                                 02/24/19 07:39 





                                 02/24/19 07:39 





                                        











PT  11.9 SECONDS (9.7-12.2)   02/23/19  07:43    


 


INR  1.1   02/23/19  07:43    


 


APTT  31 SECONDS (21-34)   02/23/19  07:43

## 2019-02-25 LAB
ALBUMIN SERPL-MCNC: 2.9 G/DL (ref 3.5–5)
ALBUMIN/GLOB SERPL: 1.2 {RATIO} (ref 1–2.1)
ALT SERPL-CCNC: 28 U/L (ref 21–72)
AST SERPL-CCNC: 33 U/L (ref 17–59)
BASOPHILS # BLD AUTO: 0 K/UL (ref 0–0.2)
BASOPHILS NFR BLD: 0.5 % (ref 0–2)
BUN SERPL-MCNC: 15 MG/DL (ref 9–20)
CALCIUM SERPL-MCNC: 8.3 MG/DL (ref 8.6–10.4)
EOSINOPHIL # BLD AUTO: 0.4 K/UL (ref 0–0.7)
EOSINOPHIL NFR BLD: 8 % (ref 0–4)
ERYTHROCYTE [DISTWIDTH] IN BLOOD BY AUTOMATED COUNT: 12.9 % (ref 11.5–14.5)
ERYTHROCYTE [DISTWIDTH] IN BLOOD BY AUTOMATED COUNT: 13 % (ref 11.5–14.5)
ERYTHROCYTE [DISTWIDTH] IN BLOOD BY AUTOMATED COUNT: 13.1 % (ref 11.5–14.5)
GFR NON-AFRICAN AMERICAN: > 60
HGB BLD-MCNC: 7.3 G/DL (ref 12–18)
HGB BLD-MCNC: 7.4 G/DL (ref 12–18)
HGB BLD-MCNC: 7.9 G/DL (ref 12–18)
LYMPHOCYTES # BLD AUTO: 1.6 K/UL (ref 1–4.3)
LYMPHOCYTES NFR BLD AUTO: 35.3 % (ref 20–40)
MCH RBC QN AUTO: 30.8 PG (ref 27–31)
MCH RBC QN AUTO: 31.3 PG (ref 27–31)
MCH RBC QN AUTO: 31.7 PG (ref 27–31)
MCHC RBC AUTO-ENTMCNC: 33.2 G/DL (ref 33–37)
MCHC RBC AUTO-ENTMCNC: 33.7 G/DL (ref 33–37)
MCHC RBC AUTO-ENTMCNC: 33.8 G/DL (ref 33–37)
MCV RBC AUTO: 92.8 FL (ref 80–94)
MCV RBC AUTO: 92.9 FL (ref 80–94)
MCV RBC AUTO: 93.7 FL (ref 80–94)
MONOCYTES # BLD: 0.3 K/UL (ref 0–0.8)
MONOCYTES NFR BLD: 7.5 % (ref 0–10)
NEUTROPHILS # BLD: 2.2 K/UL (ref 1.8–7)
NEUTROPHILS NFR BLD AUTO: 48.7 % (ref 50–75)
NRBC BLD AUTO-RTO: 0 % (ref 0–2)
PLATELET # BLD: 166 K/UL (ref 130–400)
PLATELET # BLD: 170 K/UL (ref 130–400)
PLATELET # BLD: 182 K/UL (ref 130–400)
PMV BLD AUTO: 8.6 FL (ref 7.2–11.7)
PMV BLD AUTO: 8.8 FL (ref 7.2–11.7)
PMV BLD AUTO: 8.8 FL (ref 7.2–11.7)
RBC # BLD AUTO: 2.33 MIL/UL (ref 4.4–5.9)
RBC # BLD AUTO: 2.41 MIL/UL (ref 4.4–5.9)
RBC # BLD AUTO: 2.49 MIL/UL (ref 4.4–5.9)
WBC # BLD AUTO: 4.5 K/UL (ref 4.8–10.8)
WBC # BLD AUTO: 5.3 K/UL (ref 4.8–10.8)
WBC # BLD AUTO: 5.7 K/UL (ref 4.8–10.8)

## 2019-02-25 RX ADMIN — MAGNESIUM SULFATE IN DEXTROSE SCH MLS/HR: 10 INJECTION, SOLUTION INTRAVENOUS at 11:00

## 2019-02-25 RX ADMIN — HUMAN INSULIN SCH: 100 INJECTION, SOLUTION SUBCUTANEOUS at 21:49

## 2019-02-25 RX ADMIN — HUMAN INSULIN SCH: 100 INJECTION, SOLUTION SUBCUTANEOUS at 11:30

## 2019-02-25 RX ADMIN — HUMAN INSULIN SCH: 100 INJECTION, SOLUTION SUBCUTANEOUS at 16:35

## 2019-02-25 RX ADMIN — MAGNESIUM SULFATE IN DEXTROSE SCH MLS/HR: 10 INJECTION, SOLUTION INTRAVENOUS at 10:32

## 2019-02-25 RX ADMIN — HUMAN INSULIN SCH: 100 INJECTION, SOLUTION SUBCUTANEOUS at 07:30

## 2019-02-25 NOTE — CP.PCM.PN
Subjective





- Date & Time of Evaluation


Date of Evaluation: 02/25/19


Time of Evaluation: 15:40





- Subjective


Subjective: 





GI Service





Dr Bhandari's coverage appreciated and discussed. Likely colonic diverticular 

bleed. Now stable and without further bleeding





Objective





- Vital Signs/Intake and Output


Vital Signs (last 24 hours): 


                                        











Temp Pulse Resp BP Pulse Ox


 


 98.5 F   72   20   133/73   98 


 


 02/25/19 07:10  02/25/19 11:53  02/25/19 07:10  02/25/19 07:10  02/25/19 07:10








Intake and Output: 


                                        











 02/25/19 02/25/19





 06:59 18:59


 


Intake Total 450 


 


Balance 450 














- Medications


Medications: 


                               Current Medications





Carvedilol (Coreg)  3.125 mg PO BID [facility]


   Last Admin: 02/25/19 09:52 Dose:  3.125 mg


Dextrose (Dextrose 50% Inj)  0 ml IV STAT PRN; Protocol


   PRN Reason: Hypoglycemia Protocol


Dextrose (Glutose 15)  0 gm PO ONCE PRN; Protocol


   PRN Reason: Hypoglycemia Protocol


Ferric Sodium Gluconate Complex (Ferrlecit)  125 mg IVPB DAILY [facility]


   Stop: 03/05/19 14:16


   Last Admin: 02/25/19 15:08 Dose:  125 mg


Glucagon (Glucagen Diagnostic Kit)  0 mg IM STAT PRN; Protocol


   PRN Reason: Hypoglycemia Protocol


Dextrose (Dextrose 5% In Water 1000 Ml)  1,000 mls @ 0 mls/hr IV .Q0M PRN; 

Protocol


   PRN Reason: Hypoglycemia Protocol


Insulin Human Regular (Novolin R)  0 unit SC ACHS [facility]; Protocol


   Last Admin: 02/25/19 11:30 Dose:  Not Given


Pantoprazole Sodium (Protonix Inj)  40 mg IVP Q12H [facility]


   Last Admin: 02/24/19 16:35 Dose:  40 mg


Rosuvastatin Calcium (Crestor)  10 mg PO HS [facility]


   Last Admin: 02/24/19 21:38 Dose:  10 mg











- Labs


Labs: 


                                        





                                 02/25/19 13:58 





                                 02/25/19 07:39 





                                        











PT  11.9 SECONDS (9.7-12.2)   02/23/19  07:43    


 


INR  1.1   02/23/19  07:43    


 


APTT  31 SECONDS (21-34)   02/23/19  07:43    














- Constitutional


Appears: Well, No Acute Distress





- Head Exam


Head Exam: NORMOCEPHALIC





- Respiratory Exam


Respiratory Exam: Clear to Ausculation Bilateral





- Cardiovascular Exam


Cardiovascular Exam: REGULAR RHYTHM





- GI/Abdominal Exam


GI & Abdominal Exam: Soft.  absent: Tenderness





Assessment and Plan


(1) Lower GI bleed


Assessment & Plan: 


Due to colonic diverticulosis. Now stable


Rec: Oral Iron replacement


Consider discharge


Status: Acute   





(2) Leg edema


Status: Acute

## 2019-02-25 NOTE — CP.PCM.DIS
Provider





- Provider


Date of Admission: 


02/22/19 13:16





Attending physician: 


Kellen Wilkes DO





Consults: 








02/22/19 15:32


Gastroenterology Consult Routine 


   Comment: 


   Consulting Provider: Chris Linda


   Consulting Physician: Chris Linda


   Reason for Consult: bright red blood per rectum, filled toilet w clotted red 

blood











Time Spent in preparation of Discharge (in minutes): 35





Diagnosis





- Discharge Diagnosis


(1) Diverticulosis large intestine w/o perforation or abscess w/bleeding


Status: Acute   





(2) GI bleed


Status: Acute   





(3) HTN (hypertension)


Status: Chronic   





(4) Hx of CABG


Status: Chronic   





Hospital Course





- Lab Results


Lab Results: 


                             Most Recent Lab Values











WBC  4.7 K/uL (4.8-10.8)  L  02/24/19  19:54    


 


RBC  2.51 Mil/uL (4.40-5.90)  L  02/24/19  19:54    


 


Hgb  7.7 g/dL (12.0-18.0)  L  02/24/19  19:54    


 


Hct  23.3 % (35.0-51.0)  L  02/24/19  19:54    


 


MCV  92.9 fL (80.0-94.0)   02/24/19  19:54    


 


MCH  30.7 pg (27.0-31.0)   02/24/19  19:54    


 


MCHC  33.0 g/dL (33.0-37.0)   02/24/19  19:54    


 


RDW  12.8 % (11.5-14.5)   02/24/19  19:54    


 


Plt Count  175 K/uL (130-400)   02/24/19  19:54    


 


MPV  8.6 fL (7.2-11.7)   02/24/19  19:54    


 


Neut % (Auto)  46.5 % (50.0-75.0)  L  02/24/19  19:54    


 


Lymph % (Auto)  37.4 % (20.0-40.0)   02/24/19  19:54    


 


Mono % (Auto)  7.1 % (0.0-10.0)   02/24/19  19:54    


 


Eos % (Auto)  8.6 % (0.0-4.0)  H  02/24/19  19:54    


 


Baso % (Auto)  0.4 % (0.0-2.0)   02/24/19  19:54    


 


Neut # (Auto)  2.2 K/uL (1.8-7.0)   02/24/19  19:54    


 


Lymph # (Auto)  1.8 K/uL (1.0-4.3)   02/24/19  19:54    


 


Mono # (Auto)  0.3 K/uL (0.0-0.8)   02/24/19  19:54    


 


Eos # (Auto)  0.4 K/uL (0.0-0.7)   02/24/19  19:54    


 


Baso # (Auto)  0.0 K/uL (0.0-0.2)   02/24/19  19:54    


 


PT  11.9 SECONDS (9.7-12.2)   02/23/19  07:43    


 


INR  1.1   02/23/19  07:43    


 


APTT  31 SECONDS (21-34)   02/23/19  07:43    


 


Sodium  137 mmol/L (132-148)   02/24/19  07:39    


 


Potassium  3.9 mmol/L (3.6-5.2)   02/24/19  07:39    


 


Chloride  106 mmol/L ()   02/24/19  07:39    


 


Carbon Dioxide  27 mmol/L (22-30)   02/24/19  07:39    


 


Anion Gap  8  (10-20)  L  02/24/19  07:39    


 


BUN  12 mg/dL (9-20)   02/24/19  07:39    


 


Creatinine  0.7 mg/dL (0.8-1.5)  L  02/24/19  07:39    


 


Est GFR ( Amer)  > 60   02/24/19  07:39    


 


Est GFR (Non-Af Amer)  > 60   02/24/19  07:39    


 


POC Glucose (mg/dL)  135 mg/dL ()  H  02/24/19  21:22    


 


Random Glucose  89 mg/dL ()   02/24/19  07:39    


 


Hemoglobin A1c  5.8 % (4.2-6.5)   02/23/19  07:43    


 


Calcium  8.2 mg/dl (8.6-10.4)  L  02/24/19  07:39    


 


Phosphorus  2.7 mg/dL (2.5-4.5)   02/24/19  07:39    


 


Magnesium  1.6 mg/dL (1.6-2.3)   02/24/19  07:39    


 


Ferritin  24.3 ng/mL  02/24/19  07:39    


 


Total Bilirubin  0.9 mg/dL (0.2-1.3)   02/24/19  07:39    


 


AST  35 U/L (17-59)   02/24/19  07:39    


 


ALT  32 U/L (21-72)   02/24/19  07:39    


 


Alkaline Phosphatase  51 U/L ()   02/24/19  07:39    


 


Total Protein  5.3 g/dL (6.3-8.3)  L  02/24/19  07:39    


 


Albumin  2.9 g/dL (3.5-5.0)  L  02/24/19  07:39    


 


Globulin  2.4 gm/dL (2.2-3.9)   02/24/19  07:39    


 


Albumin/Globulin Ratio  1.2  (1.0-2.1)   02/24/19  07:39    


 


Vitamin B12  266 pg/mL (239-931)   02/23/19  07:43    


 


Folate  19.9 ng/mL  02/23/19  07:43    


 


Free T4  0.72 ng/dL (0.78-2.19)  L  02/23/19  07:43    


 


TSH 3rd Generation  0.43 mIU/L (0.46-4.68)  L  02/23/19  07:43    


 


Urine Color  Yellow  (YELLOW)   02/23/19  02:35    


 


Urine Clarity  Hazy  (Clear)   02/23/19  02:35    


 


Urine pH  5.0  (5.0-8.0)   02/23/19  02:35    


 


Ur Specific Gravity  1.020  (1.003-1.030)   02/23/19  02:35    


 


Urine Protein  1+ mg/dL (NEGATIVE)  H  02/23/19  02:35    


 


Urine Glucose (UA)  Normal mg/dL (Normal)   02/23/19  02:35    


 


Urine Ketones  Trace mg/dL (NEGATIVE)   02/23/19  02:35    


 


Urine Blood  Negative  (NEGATIVE)   02/23/19  02:35    


 


Urine Nitrate  Negative  (NEGATIVE)   02/23/19  02:35    


 


Urine Bilirubin  Negative  (NEGATIVE)   02/23/19  02:35    


 


Urine Urobilinogen  Normal mg/dL (0.2-1.0)   02/23/19  02:35    


 


Ur Leukocyte Esterase  Neg Shakira/uL (Negative)   02/23/19  02:35    


 


Urine WBC (Auto)  4 /hpf (0-5)   02/23/19  02:35    


 


Urine RBC (Auto)  1 /hpf (0-3)   02/23/19  02:35    


 


Ur Squamous Epith Cells  1 /hpf (0-5)   02/23/19  02:35    


 


Stool Occult Blood  Positive  (NEGATIVE)  H  02/24/19  00:25    


 


Urine Opiates Screen  Negative  (NEGATIVE)   02/23/19  02:35    


 


Urine Methadone Screen  Negative  (NEGATIVE)   02/23/19  02:35    


 


Ur Barbiturates Screen  Negative  (NEGATIVE)   02/23/19  02:35    


 


Ur Phencyclidine Scrn  Negative  (NEGATIVE)   02/23/19  02:35    


 


Ur Amphetamines Screen  Negative  (NEGATIVE)   02/23/19  02:35    


 


U Benzodiazepines Scrn  Negative  (NEGATIVE)   02/23/19  02:35    


 


U Oth Cocaine Metabols  Negative  (NEGATIVE)   02/23/19  02:35    


 


U Cannabinoids Screen  Negative  (NEGATIVE)   02/23/19  02:35    


 


Alcohol, Quantitative  < 10 mg/dl (0-10)   02/23/19  07:43    


 


Blood Type  O POSITIVE   02/22/19  12:42    


 


Antibody Screen  Negative   02/22/19  12:42    














- Hospital Course


Hospital Course: 





On admission:


68M with a PMHx of HTN, CAD, and hemorrhoids presents to the ED with a 1 day hx 

of blood in his toilet. Pt reports bright red clotted stool starting at aroung 

17:00 yesterday.  He had another episode this morning at 8:00. Pt has hade 

episode of bleeding per rectum earlier this year in July for which he followed 

up with a GI doctor at [facility]. Pt says the last bloody bowel 

movement caused him to have some light headedness. Pt has been taking plavix for

his CAD but stopped this morning. Pt reports painless and loose stool. Reports 

last drink was sunday. 








Hospital course:


Pt was started on fluids, PPI for lower GI bleed. He was started on his home 

antihypertensive medications and beta blocker. Aspirin and plavix were held due 

to presentation. GI was consulted. Pt scheduled for inpatient EGD and 

colonoscopy due to drop in hemoglobin. Pt's rectal bleeding resolved during 

inpatient stay. e underwent Upper EGD 2/24/19 and this showed Gastritis and the 

Gastric Antrum was biopsied. He also underwent Colonoscopy 2/24/19 and this 

showed Diverticulosis and 2 small Sigmoid Colon polyps which were biopsied. 

Hgb/Hct have been stable since initial drop. Pt was restarted on Aspirin prior 

to discharge, and was instructed to start Plavix the day after discharge. He was

discharged with instructions to follow up with PMD, GI, Cardio.





On discharge interview:


Pt was seen and examined at bedside. Pt is resting comfortably. No acute events 

overnight. He reports improvement of fatigue. He denies fever, chills, chest 

pain, palpitations, sob, abdominal pain, n/v/d, hematochezia, melena, numbness 

or tingling, headache, dizziness, lightheadedness, syncope. He is ambulating 

without difficulties on his own.





This is a summary of the hospital course, please see EMR for full details.





Discharge Exam





- Additional Findings


Additional findings: 





- Constitutional


Appears: Well, Non-toxic, No Acute Distress





- Head Exam


Head Exam: ATRAUMATIC, NORMAL INSPECTION





- Eye Exam


Eye Exam: EOMI, Normal appearance. No conjunctival pallor





- ENT Exam


ENT Exam: Mucous Membranes Moist





- Respiratory Exam


Respiratory Exam: Clear to Auscultation Bilateral, NORMAL BREATHING PATTERN.  

absent: Rales, Wheezes





- Cardiovascular Exam


Cardiovascular Exam: RRR, +S1, +S2





- GI/Abdominal Exam


GI & Abdominal Exam: Normal Bowel Sounds, Soft.  absent: Guarding, Rebound, 

Rigid, Tenderness





- Extremities Exam


Extremities exam: Positive for: pedal pulses present





- Neurological Exam


Neurological exam: Alert,  Oriented x3





- Skin


Skin Exam: Dry, Pallor, Warm





Discharge Plan





- Discharge Medications


Prescriptions: 


Ferrous Sulfate 325 mg PO BID #60 tablet





- Follow Up Plan


Condition: STABLE


Disposition: HOME/ ROUTINE


Instructions:  Colonoscopy (DC), Gastrointestinal Bleeding (DC), Upper GI 

Endoscopy (DC), Ferrous Sulfate, Normocytic Normochromic Anemia (DC)


Additional Instructions: 


Pt is medically stable for discharge home as per Dr. Wilkes.





Pt should continue taking his previously prescribed medications. He can resume 

taking Plavix 75 mg PO daily, starting tomorrow (2/27). Additionally, pt should 

start taking Ferrous sulfate 325 mg one tab by mouth in the morning and one tab 

by mouth in the evening. Pt should take this medications with vitamin c, or 

juices due to better absorption. Pt should also take stool softeners with the 

Iron pill, which he will be provided a prescription for or he can buy it over 

the counter.





Pt should follow up with GI, Dr. Mitchell, within 1 week of discharge from 

hospital. Pt should follow up with his PMD, Dr. Avila, and Cardiologist, Dr. Roca, this week.





Should symptoms worsen, please return to the nearest ED for further evaluation.











Pt es mdicamente estable para el [facility] hospital[facility] segn el Dr. Wilkes.





Pt debe seguir tomando jessie medicamentos previamente prescritos. Puede volver a 

alphonse Plavix 75 mg PO diariamente, a partir de maana (2/27). Adems, pt debe 

comenzar a alphonse 325 mg de sulfato ferroso yogesh pastilla por va oral por la 

maana y yogesh por va oral por la noche. Pt debe alphonse estos medicamentos con 

vitamina C, o jugos debido a yogesh mejor absorcin. Pt tambin debe alphonse 

ablandadores de heces con la pldora de aurea, por lo que se le proporcionar 

yogesh receta mdica o puede comprarla en el mostrador.





Pt debe hacer un seguimiento con GI, el Dr. Mitchell, dentro de la primera semana 

despus del [facility] hospital[facility]. Pt debera seguir con erickson PMD, el Dr. Avila, y el

cardilogo, el Dr. Roca, esta semana.





En jennifer de que los sntomas empeoren, regrese al servicio de urgencias ms 

cercano para yogesh evaluacin adicional.





Referrals: 


Alfred Mitchell MD [Staff Provider] -

## 2019-02-25 NOTE — CP.PCM.PN
Subjective





- Date & Time of Evaluation


Date of Evaluation: 02/25/19


Time of Evaluation: 12:00





- Subjective


Subjective: 





PGY1 medicine progress note for Dr. Wilkes





Pt was seen and examined at bedside. Pt is resting comfortably. No acute events 

overnight. He reports weakness described as fatigue. He denies fever, chills, 

chest pain, palpitations, sob, abdominal pain, n/v/d, hematochezia, melena, 

numbness or tingling, headache, dizziness, lightheadedness, syncope.





Objective





- Vital Signs/Intake and Output


Vital Signs (last 24 hours): 


                                        











Temp Pulse Resp BP Pulse Ox


 


 97.2 F L  71   20   134/71   100 


 


 02/25/19 15:34  02/25/19 15:34  02/25/19 15:34  02/25/19 15:34  02/25/19 15:34








Intake and Output: 


                                        











 02/25/19 02/25/19





 06:59 18:59


 


Intake Total 450 750


 


Balance 450 750














- Medications


Medications: 


                               Current Medications





Carvedilol (Coreg)  3.125 mg PO BID [facility]


   Last Admin: 02/25/19 09:52 Dose:  3.125 mg


Dextrose (Dextrose 50% Inj)  0 ml IV STAT PRN; Protocol


   PRN Reason: Hypoglycemia Protocol


Dextrose (Glutose 15)  0 gm PO ONCE PRN; Protocol


   PRN Reason: Hypoglycemia Protocol


Ferric Sodium Gluconate Complex (Ferrlecit)  125 mg IVPB DAILY [facility]


   Stop: 03/05/19 14:16


   Last Admin: 02/25/19 15:08 Dose:  125 mg


Glucagon (Glucagen Diagnostic Kit)  0 mg IM STAT PRN; Protocol


   PRN Reason: Hypoglycemia Protocol


Dextrose (Dextrose 5% In Water 1000 Ml)  1,000 mls @ 0 mls/hr IV .Q0M PRN; 

Protocol


   PRN Reason: Hypoglycemia Protocol


Insulin Human Regular (Novolin R)  0 unit SC ACHS [facility]; Protocol


   Last Admin: 02/25/19 16:35 Dose:  Not Given


Pantoprazole Sodium (Protonix Inj)  40 mg IVP Q12H [facility]


   Last Admin: 02/24/19 16:35 Dose:  40 mg


Rosuvastatin Calcium (Crestor)  10 mg PO HS [facility]


   Last Admin: 02/24/19 21:38 Dose:  10 mg











- Labs


Labs: 


                                        





                                 02/25/19 13:58 





                                 02/25/19 07:39 





                                        











PT  11.9 SECONDS (9.7-12.2)   02/23/19  07:43    


 


INR  1.1   02/23/19  07:43    


 


APTT  31 SECONDS (21-34)   02/23/19  07:43    














- Additional Findings


Additional findings: 





- Constitutional


Appears: Well, Non-toxic, No Acute Distress





- Head Exam


Head Exam: ATRAUMATIC, NORMAL INSPECTION





- Eye Exam


Eye Exam: EOMI, Normal appearance. No conjunctival pallor





- ENT Exam


ENT Exam: Mucous Membranes Moist





- Respiratory Exam


Respiratory Exam: Clear to Auscultation Bilateral, NORMAL BREATHING PATTERN.  

absent: Rales, Wheezes





- Cardiovascular Exam


Cardiovascular Exam: RRR, +S1, +S2





- GI/Abdominal Exam


GI & Abdominal Exam: Normal Bowel Sounds, Soft.  absent: Guarding, Rebound, 

Rigid, Tenderness





- Extremities Exam


Extremities exam: Positive for: pedal pulses present





- Neurological Exam


Neurological exam: Alert,  Oriented x3





- Skin


Skin Exam: Dry, Pallor, Warm





Assessment and Plan





- Assessment and Plan (Free Text)


Assessment: 





1). Acute Lower GI bleed Possibly Due to Colonic Pathology


Status: Acute


GI consulted: Dr Linda however Dr. Bhandari is covering this weekend


PMD Dr Avila contacted 2/22/19 by resident Dr. Francis concerning prior 

Colonoscopy done at [facility] in July 2018 as per patient: Dr. Avila stated that he had no records of this.





There has been a large drop in HgB/Hct 2/23/19 however there are NO more bloody 

bowel movements, Abdominal Exam is unremarkable, there is NO tachycardia, and 

patient was on LR @ 125 ml/hour: could this be dilutional effect or due to 

Colonic bleeding?


GI Dr. Bhandari: due to large drop in HgB, EGD and Colonoscopy performed morning

2/24/19:


* Upper EGD: localized mild inflammation characterized by erythema and 

  granularity was found in the Gastric Antrum and biopsies were taken


* Colonoscopy: internal hemorrhoids that are characterized as Grade I, multiple 

  small mouth diverticuli in sigmoid colon, and two flat topped polyps were 

  biopsied


Continue Protonix 40 mg IV Q12H


Full Heart Healthy Diet


H/H is stable





2). Anemia secondary to Lower GI bleed


Status: Acute


H/H is stable


There has been a large drop in HgB/Hct 2/23/19 however there are NO more blood 

bowel movements, Abdominal Exam is unremarkable, and patient was on LR @ 125 

ml/hour.


LR was discontinued 2/24/19 as patient was started on Full Heart Healthy Diet


Ferrlecit 125 mg IVPB daily will start today





3). Leukocytosis likely secondary to Stress


Status: Resolved


NO fevers


NO infiltrate noted on admission 2/22/19 Chest X Ray





4). Elevated BUN


Status: Resolved


LRs @ 125 ml/hr was discontinued after Upper EGD and Colonoscopy on 2/24/19





5). Hyperglycemia Upon Admission


Status: Resolved


NO history of DM


HgBA1C is normal at 5.8





6). Abnormal Thyroid Blood Work


Status: Acute


TSH and T4 slightly low. Patient should have TSH and Free T4 repeated in 2 to 4 

weeks time





7). Excessive ETOH On Weekends


Status: Chronic


States that he has two 6 packs over the weekends


Last drink was 6 beers on Sunday 2/17/19


NO signs of withdrawal on exam


Long and detailed conversation regarding cessation of alcohol in light of GI 

bleeding, pt understands.





8). Hx of CAD S/P CABG 2013


Status: Chronic


Crestor 10 HS


Coreg 3.125 qd


ASA and Plavix in light of the Lower GI Bleeding, were both held upon admission 

on 2/22/19


As long as HgB/Hct are stable, then restart Plavix on 2/27/19


ASA 81 mg PO will start 2/26/19





9). Hx of HTN


Status: Chronic


Coreg 3.125 qd





10. Lung CA Screening


Status: Chronic


Former smoker that has a 30 + pack per day history of smoking with having quit <

15 years ago therefore will need outpatient CT Chest w/o contrast for Lung CA 

screening through his PMD Dr. Avila's office and this should be included in his

discharge instructions





11). PPx


Hold anticoagulation secondary to Lower GI Bleed


SCDs bilaterally


Protonix 40 mg IV Q12H indicated due to Lower GI Bleed


Full Heart Healthy Diet





Disposition:


As long as HgB/Hct are stable then restart the Plavix on 2/27/19


Discharge pending PT/OT evaluation due to pt complaint of weakness.


Patient will need to follow up with GI Dr. Mitchell (Dr. Bhandari was covering for

him) for results of Sigmoid Colon Polyp x 2 biopsy results and Gastric Antrum 

Biopsy results


Patient will need to have his TSH and Free T4 remeasured by his PMD Dr. Avila 

in 2 to 4 weeks time


Patient will need to have CT Chest w/o contrast through his PMD Dr. Avila for 

Lung CA screening as an outpatient as he fulfills Lung Cancer screening criteria





Case discussed with Dr. Wilkes.

## 2019-02-26 VITALS
SYSTOLIC BLOOD PRESSURE: 119 MMHG | OXYGEN SATURATION: 100 % | DIASTOLIC BLOOD PRESSURE: 66 MMHG | TEMPERATURE: 98.1 F | HEART RATE: 60 BPM

## 2019-02-26 LAB
ALBUMIN SERPL-MCNC: 3.2 G/DL (ref 3.5–5)
ALBUMIN/GLOB SERPL: 1.3 {RATIO} (ref 1–2.1)
ALT SERPL-CCNC: 28 U/L (ref 21–72)
AST SERPL-CCNC: 29 U/L (ref 17–59)
BASOPHILS # BLD AUTO: 0 K/UL (ref 0–0.2)
BASOPHILS NFR BLD: 0.4 % (ref 0–2)
BUN SERPL-MCNC: 13 MG/DL (ref 9–20)
CALCIUM SERPL-MCNC: 8.5 MG/DL (ref 8.6–10.4)
EOSINOPHIL # BLD AUTO: 0.4 K/UL (ref 0–0.7)
EOSINOPHIL NFR BLD: 8.3 % (ref 0–4)
ERYTHROCYTE [DISTWIDTH] IN BLOOD BY AUTOMATED COUNT: 13.1 % (ref 11.5–14.5)
GFR NON-AFRICAN AMERICAN: > 60
HGB BLD-MCNC: 7.7 G/DL (ref 12–18)
LYMPHOCYTES # BLD AUTO: 1.7 K/UL (ref 1–4.3)
LYMPHOCYTES NFR BLD AUTO: 34.2 % (ref 20–40)
MCH RBC QN AUTO: 31.1 PG (ref 27–31)
MCHC RBC AUTO-ENTMCNC: 33 G/DL (ref 33–37)
MCV RBC AUTO: 94.3 FL (ref 80–94)
METHYLMALONIC ACID,SERUM: 165 NMOL/L (ref 87–318)
MONOCYTES # BLD: 0.4 K/UL (ref 0–0.8)
MONOCYTES NFR BLD: 7.7 % (ref 0–10)
NEUTROPHILS # BLD: 2.5 K/UL (ref 1.8–7)
NEUTROPHILS NFR BLD AUTO: 49.4 % (ref 50–75)
NRBC BLD AUTO-RTO: 0 % (ref 0–2)
PLATELET # BLD: 194 K/UL (ref 130–400)
PMV BLD AUTO: 8.5 FL (ref 7.2–11.7)
RBC # BLD AUTO: 2.49 MIL/UL (ref 4.4–5.9)
WBC # BLD AUTO: 5.1 K/UL (ref 4.8–10.8)

## 2019-02-26 RX ADMIN — HUMAN INSULIN SCH: 100 INJECTION, SOLUTION SUBCUTANEOUS at 07:51

## 2019-02-26 NOTE — CP.PCM.PN
Subjective





- Date & Time of Evaluation


Date of Evaluation: 02/26/19


Time of Evaluation: 08:22





- Subjective


Subjective: 





f/u constip, R bleed.


Denies RB, melena, abd pain, fever, CP, SOB, HA, cough





Objective





- Vital Signs/Intake and Output


Vital Signs (last 24 hours): 


                                        











Temp Pulse Resp BP Pulse Ox


 


 98 F   69   20   128/67   99 


 


 02/25/19 23:10  02/26/19 03:56  02/25/19 23:10  02/25/19 23:10  02/25/19 23:10








Intake and Output: 


                                        











 02/26/19 02/26/19





 06:59 18:59


 


Intake Total 450 


 


Balance 450 














- Medications


Medications: 


                               Current Medications





Aspirin (Ecotrin)  81 mg PO DAILY [facility]


Carvedilol (Coreg)  3.125 mg PO BID [facility]


   Last Admin: 02/25/19 17:50 Dose:  3.125 mg


Dextrose (Dextrose 50% Inj)  0 ml IV STAT PRN; Protocol


   PRN Reason: Hypoglycemia Protocol


Dextrose (Glutose 15)  0 gm PO ONCE PRN; Protocol


   PRN Reason: Hypoglycemia Protocol


Ferric Sodium Gluconate Complex (Ferrlecit)  125 mg IVPB DAILY@0900 [facility]


   Stop: 03/06/19 09:01


Glucagon (Glucagen Diagnostic Kit)  0 mg IM STAT PRN; Protocol


   PRN Reason: Hypoglycemia Protocol


Dextrose (Dextrose 5% In Water 1000 Ml)  1,000 mls @ 0 mls/hr IV .Q0M PRN; 

Protocol


   PRN Reason: Hypoglycemia Protocol


Insulin Human Regular (Novolin R)  0 unit SC ACHS [facility]; Protocol


   Last Admin: 02/26/19 07:51 Dose:  Not Given


Pantoprazole Sodium (Protonix Inj)  40 mg IVP Q12H [facility]


   Last Admin: 02/26/19 05:17 Dose:  40 mg


Rosuvastatin Calcium (Crestor)  10 mg PO HS [facility]


   Last Admin: 02/25/19 21:12 Dose:  10 mg











- Labs


Labs: 


                                        





                                 02/26/19 06:40 





                                 02/26/19 06:40 





                                        











PT  11.9 SECONDS (9.7-12.2)   02/23/19  07:43    


 


INR  1.1   02/23/19  07:43    


 


APTT  31 SECONDS (21-34)   02/23/19  07:43    














- Constitutional


Appears: Well





- Respiratory Exam


Respiratory Exam: Clear to Ausculation Bilateral





- Cardiovascular Exam


Cardiovascular Exam: RRR





- GI/Abdominal Exam


GI & Abdominal Exam: Soft, Normal Bowel Sounds.  absent: Tenderness, Mass





- Extremities Exam


Extremities Exam: absent: Calf Tenderness





- Neurological Exam


Neurological Exam: Alert, Awake, Oriented x3





Assessment and Plan


(1) Diverticulosis large intestine w/o perforation or abscess w/bleeding


Assessment & Plan: 


No further bleeding.


Status: Acute   





(2) Colon polyp


Assessment & Plan: 


Check pathology


Status: Acute   





(3) Anemia


Assessment & Plan: 


Follow Hb


Status: Acute   





(4) Lower GI bleed


Assessment & Plan: 


Likely from diverticulosis


Status: Acute   





(5) Hx of CABG


Status: Chronic   





(6) HTN (hypertension)


Status: Chronic